# Patient Record
Sex: MALE | Race: WHITE | Employment: FULL TIME | URBAN - METROPOLITAN AREA
[De-identification: names, ages, dates, MRNs, and addresses within clinical notes are randomized per-mention and may not be internally consistent; named-entity substitution may affect disease eponyms.]

---

## 2021-01-01 ENCOUNTER — APPOINTMENT (OUTPATIENT)
Dept: GENERAL RADIOLOGY | Age: 46
DRG: 003 | End: 2021-01-01
Attending: THORACIC SURGERY (CARDIOTHORACIC VASCULAR SURGERY)
Payer: COMMERCIAL

## 2021-01-01 ENCOUNTER — APPOINTMENT (OUTPATIENT)
Dept: ULTRASOUND IMAGING | Age: 46
DRG: 003 | End: 2021-01-01
Attending: INTERNAL MEDICINE
Payer: COMMERCIAL

## 2021-01-01 ENCOUNTER — APPOINTMENT (OUTPATIENT)
Dept: NON INVASIVE DIAGNOSTICS | Age: 46
DRG: 003 | End: 2021-01-01
Attending: INTERNAL MEDICINE
Payer: COMMERCIAL

## 2021-01-01 ENCOUNTER — APPOINTMENT (OUTPATIENT)
Dept: GENERAL RADIOLOGY | Age: 46
DRG: 003 | End: 2021-01-01
Attending: NURSE PRACTITIONER
Payer: COMMERCIAL

## 2021-01-01 ENCOUNTER — APPOINTMENT (OUTPATIENT)
Dept: GENERAL RADIOLOGY | Age: 46
End: 2021-01-01
Attending: EMERGENCY MEDICINE
Payer: COMMERCIAL

## 2021-01-01 ENCOUNTER — ANESTHESIA EVENT (OUTPATIENT)
Dept: CARDIOTHORACIC SURGERY | Age: 46
DRG: 003 | End: 2021-01-01
Payer: COMMERCIAL

## 2021-01-01 ENCOUNTER — ANESTHESIA (OUTPATIENT)
Dept: CARDIOTHORACIC SURGERY | Age: 46
DRG: 003 | End: 2021-01-01
Payer: COMMERCIAL

## 2021-01-01 ENCOUNTER — HOSPITAL ENCOUNTER (INPATIENT)
Age: 46
LOS: 2 days | DRG: 003 | End: 2021-06-11
Attending: ANESTHESIOLOGY | Admitting: ANESTHESIOLOGY
Payer: COMMERCIAL

## 2021-01-01 ENCOUNTER — HOSPITAL ENCOUNTER (OUTPATIENT)
Dept: NON INVASIVE DIAGNOSTICS | Age: 46
Discharge: HOME | End: 2021-06-11
Attending: THORACIC SURGERY (CARDIOTHORACIC VASCULAR SURGERY)

## 2021-01-01 ENCOUNTER — HOSPITAL ENCOUNTER (EMERGENCY)
Age: 46
Discharge: ANOTHER HOSPITAL | End: 2021-06-09
Attending: EMERGENCY MEDICINE
Payer: COMMERCIAL

## 2021-01-01 VITALS
BODY MASS INDEX: 44.1 KG/M2 | HEIGHT: 67 IN | DIASTOLIC BLOOD PRESSURE: 111 MMHG | SYSTOLIC BLOOD PRESSURE: 126 MMHG | WEIGHT: 281 LBS | OXYGEN SATURATION: 90 % | RESPIRATION RATE: 16 BRPM | HEART RATE: 86 BPM | TEMPERATURE: 99.6 F

## 2021-01-01 VITALS
RESPIRATION RATE: 39 BRPM | WEIGHT: 282.19 LBS | BODY MASS INDEX: 44.29 KG/M2 | DIASTOLIC BLOOD PRESSURE: 99 MMHG | OXYGEN SATURATION: 99 % | HEIGHT: 67 IN | SYSTOLIC BLOOD PRESSURE: 150 MMHG | TEMPERATURE: 96 F | HEART RATE: 120 BPM

## 2021-01-01 DIAGNOSIS — R09.02 HYPOXEMIA: ICD-10-CM

## 2021-01-01 DIAGNOSIS — I50.810 RIGHT VENTRICULAR FAILURE (HCC): ICD-10-CM

## 2021-01-01 DIAGNOSIS — K72.00 SHOCK LIVER: ICD-10-CM

## 2021-01-01 DIAGNOSIS — R58 BLEEDING: ICD-10-CM

## 2021-01-01 DIAGNOSIS — U07.1 ACUTE RESPIRATORY FAILURE DUE TO COVID-19 (HCC): ICD-10-CM

## 2021-01-01 DIAGNOSIS — I26.99 ACUTE MASSIVE PULMONARY EMBOLISM (HCC): ICD-10-CM

## 2021-01-01 DIAGNOSIS — U07.1 PNEUMONIA DUE TO COVID-19 VIRUS: Primary | ICD-10-CM

## 2021-01-01 DIAGNOSIS — J96.00 ACUTE RESPIRATORY FAILURE DUE TO COVID-19 (HCC): ICD-10-CM

## 2021-01-01 DIAGNOSIS — N17.1 ACUTE RENAL FAILURE WITH ACUTE CORTICAL NECROSIS (HCC): ICD-10-CM

## 2021-01-01 DIAGNOSIS — J12.82 PNEUMONIA DUE TO COVID-19 VIRUS: Primary | ICD-10-CM

## 2021-01-01 DIAGNOSIS — D65 DIC (DISSEMINATED INTRAVASCULAR COAGULATION) (HCC): ICD-10-CM

## 2021-01-01 DIAGNOSIS — R57.0 CARDIOGENIC SHOCK (HCC): ICD-10-CM

## 2021-01-01 LAB
ACT BLD: 263 SECS (ref 79–138)
ACT BLD: 274 SECS (ref 79–138)
ALBUMIN SERPL-MCNC: 1.2 G/DL (ref 3.5–5)
ALBUMIN SERPL-MCNC: 2 G/DL (ref 3.5–5)
ALBUMIN SERPL-MCNC: 2.4 G/DL (ref 3.5–5)
ALBUMIN SERPL-MCNC: 2.4 G/DL (ref 3.5–5)
ALBUMIN/GLOB SERPL: 0.5 {RATIO} (ref 1.1–2.2)
ALP SERPL-CCNC: 157 U/L (ref 45–117)
ALP SERPL-CCNC: 72 U/L (ref 45–117)
ALP SERPL-CCNC: 80 U/L (ref 45–117)
ALP SERPL-CCNC: 82 U/L (ref 45–117)
ALT SERPL-CCNC: 107 U/L (ref 12–78)
ALT SERPL-CCNC: 124 U/L (ref 12–78)
ALT SERPL-CCNC: 86 U/L (ref 12–78)
ALT SERPL-CCNC: 980 U/L (ref 12–78)
ANION GAP SERPL CALC-SCNC: 11 MMOL/L (ref 5–15)
ANION GAP SERPL CALC-SCNC: 16 MMOL/L (ref 5–15)
ANION GAP SERPL CALC-SCNC: 33 MMOL/L (ref 5–15)
ANION GAP SERPL CALC-SCNC: 6 MMOL/L (ref 5–15)
ANION GAP SERPL CALC-SCNC: 8 MMOL/L (ref 5–15)
ANION GAP SERPL CALC-SCNC: 8 MMOL/L (ref 5–15)
APPEARANCE UR: CLEAR
ARTERIAL PATENCY WRIST A: ABNORMAL
ARTERIAL PATENCY WRIST A: YES
AST SERPL W P-5'-P-CCNC: 135 U/L (ref 15–37)
AST SERPL-CCNC: 127 U/L (ref 15–37)
AST SERPL-CCNC: 1742 U/L (ref 15–37)
AST SERPL-CCNC: 97 U/L (ref 15–37)
ATRIAL RATE: 104 BPM
ATRIAL RATE: 76 BPM
BACTERIA URNS QL MICRO: ABNORMAL /HPF
BASE DEFICIT BLDA-SCNC: 12.4 MMOL/L
BASE DEFICIT BLDA-SCNC: 19.1 MMOL/L
BASE DEFICIT BLDA-SCNC: 9.4 MMOL/L
BASOPHILS # BLD: 0 K/UL (ref 0–0.1)
BASOPHILS # BLD: 0 K/UL (ref 0–0.1)
BASOPHILS # BLD: 0 K/UL (ref 0–0.2)
BASOPHILS # BLD: 0.1 K/UL (ref 0–0.1)
BASOPHILS NFR BLD: 0 % (ref 0–1)
BASOPHILS NFR BLD: 0 % (ref 0–1)
BASOPHILS NFR BLD: 0 % (ref 0–2.5)
BASOPHILS NFR BLD: 1 % (ref 0–1)
BDY SITE: ABNORMAL
BILIRUB SERPL-MCNC: 0.3 MG/DL (ref 0.2–1)
BILIRUB SERPL-MCNC: 0.5 MG/DL (ref 0.2–1)
BILIRUB SERPL-MCNC: 0.5 MG/DL (ref 0.2–1)
BILIRUB SERPL-MCNC: 1.1 MG/DL (ref 0.2–1)
BILIRUB UR QL: NEGATIVE
BLD PROD TYP BPU: NORMAL
BLD PROD TYP BPU: NORMAL
BPU ID: NORMAL
BPU ID: NORMAL
BUN SERPL-MCNC: 22 MG/DL (ref 6–20)
BUN SERPL-MCNC: 24 MG/DL (ref 6–20)
BUN SERPL-MCNC: 26 MG/DL (ref 6–20)
BUN SERPL-MCNC: 30 MG/DL (ref 6–20)
BUN SERPL-MCNC: 31 MG/DL (ref 6–20)
BUN SERPL-MCNC: 34 MG/DL (ref 6–20)
BUN/CREAT SERPL: 14 (ref 12–20)
BUN/CREAT SERPL: 17 (ref 12–20)
BUN/CREAT SERPL: 23 (ref 12–20)
BUN/CREAT SERPL: 28 (ref 12–20)
BUN/CREAT SERPL: 28 (ref 12–20)
BUN/CREAT SERPL: 35 (ref 12–20)
C DIFF GDH STL QL: NEGATIVE
C DIFF TOX A+B STL QL IA: NEGATIVE
CA-I BLD-MCNC: 8.3 MG/DL (ref 8.5–10.1)
CALCIUM SERPL-MCNC: 8.2 MG/DL (ref 8.5–10.1)
CALCIUM SERPL-MCNC: 8.4 MG/DL (ref 8.5–10.1)
CALCIUM SERPL-MCNC: 8.5 MG/DL (ref 8.5–10.1)
CALCIUM SERPL-MCNC: 8.7 MG/DL (ref 8.5–10.1)
CALCIUM SERPL-MCNC: 8.8 MG/DL (ref 8.5–10.1)
CALCULATED P AXIS, ECG09: 29 DEGREES
CALCULATED P AXIS, ECG09: 36 DEGREES
CALCULATED R AXIS, ECG10: 34 DEGREES
CALCULATED R AXIS, ECG10: 68 DEGREES
CALCULATED T AXIS, ECG11: 81 DEGREES
CALCULATED T AXIS, ECG11: 88 DEGREES
CHLORIDE SERPL-SCNC: 101 MMOL/L (ref 97–108)
CHLORIDE SERPL-SCNC: 102 MMOL/L (ref 97–108)
CHLORIDE SERPL-SCNC: 103 MMOL/L (ref 97–108)
CHLORIDE SERPL-SCNC: 106 MMOL/L (ref 97–108)
CHLORIDE SERPL-SCNC: 96 MMOL/L (ref 97–108)
CHLORIDE SERPL-SCNC: 99 MMOL/L (ref 97–108)
CHLORIDE UR-SCNC: <10 MMOL/L
CO2 SERPL-SCNC: 19 MMOL/L (ref 21–32)
CO2 SERPL-SCNC: 24 MMOL/L (ref 21–32)
CO2 SERPL-SCNC: 25 MMOL/L (ref 21–32)
CO2 SERPL-SCNC: 27 MMOL/L (ref 21–32)
COLOR UR: ABNORMAL
COMMENT, HOLDF: NORMAL
CREAT SERPL-MCNC: 0.8 MG/DL (ref 0.7–1.3)
CREAT SERPL-MCNC: 0.87 MG/DL (ref 0.7–1.3)
CREAT SERPL-MCNC: 0.89 MG/DL (ref 0.7–1.3)
CREAT SERPL-MCNC: 1.33 MG/DL (ref 0.7–1.3)
CREAT SERPL-MCNC: 1.85 MG/DL (ref 0.7–1.3)
CREAT SERPL-MCNC: 2.03 MG/DL (ref 0.7–1.3)
CREAT UR-MCNC: 126 MG/DL
CRP SERPL HS-MCNC: >9.5 MG/L
D DIMER PPP FEU-MCNC: 6.9 MG/L FEU (ref 0–0.65)
DIAGNOSIS, 93000: NORMAL
DIAGNOSIS, 93000: NORMAL
DIFFERENTIAL METHOD BLD: ABNORMAL
EOSINOPHIL # BLD: 0 K/UL (ref 0–0.4)
EOSINOPHIL # BLD: 0 K/UL (ref 0–0.7)
EOSINOPHIL NFR BLD: 0 % (ref 0.9–2.9)
EOSINOPHIL NFR BLD: 0 % (ref 0–7)
EPITH CASTS URNS QL MICRO: ABNORMAL /LPF
ERYTHROCYTE [DISTWIDTH] IN BLOOD BY AUTOMATED COUNT: 13 % (ref 11.5–14.5)
ERYTHROCYTE [DISTWIDTH] IN BLOOD BY AUTOMATED COUNT: 13.1 % (ref 11.5–14.5)
ERYTHROCYTE [DISTWIDTH] IN BLOOD BY AUTOMATED COUNT: 13.2 % (ref 11.5–14.5)
ERYTHROCYTE [DISTWIDTH] IN BLOOD BY AUTOMATED COUNT: 13.5 % (ref 11.5–14.5)
ERYTHROCYTE [DISTWIDTH] IN BLOOD BY AUTOMATED COUNT: 13.7 % (ref 11.5–14.5)
EST. AVERAGE GLUCOSE BLD GHB EST-MCNC: 140 MG/DL
FERRITIN SERPL-MCNC: 5336 NG/ML (ref 26–388)
FIBRINOGEN PPP-MCNC: 719 MG/DL (ref 200–475)
FIO2 ON VENT: 100 %
GLOBULIN SER CALC-MCNC: 2.2 G/DL (ref 2–4)
GLOBULIN SER CALC-MCNC: 3.9 G/DL (ref 2–4)
GLOBULIN SER CALC-MCNC: 4.4 G/DL (ref 2–4)
GLOBULIN SER CALC-MCNC: 4.5 G/DL (ref 2–4)
GLUCOSE BLD STRIP.AUTO-MCNC: 200 MG/DL (ref 65–117)
GLUCOSE BLD STRIP.AUTO-MCNC: 213 MG/DL (ref 65–117)
GLUCOSE BLD STRIP.AUTO-MCNC: 216 MG/DL (ref 65–117)
GLUCOSE BLD STRIP.AUTO-MCNC: 219 MG/DL (ref 65–117)
GLUCOSE BLD STRIP.AUTO-MCNC: 222 MG/DL (ref 65–117)
GLUCOSE BLD STRIP.AUTO-MCNC: 222 MG/DL (ref 65–117)
GLUCOSE BLD STRIP.AUTO-MCNC: 232 MG/DL (ref 65–117)
GLUCOSE BLD STRIP.AUTO-MCNC: 233 MG/DL (ref 65–117)
GLUCOSE SERPL-MCNC: 196 MG/DL (ref 65–100)
GLUCOSE SERPL-MCNC: 213 MG/DL (ref 65–100)
GLUCOSE SERPL-MCNC: 236 MG/DL (ref 65–100)
GLUCOSE SERPL-MCNC: 237 MG/DL (ref 65–100)
GLUCOSE SERPL-MCNC: 240 MG/DL (ref 65–100)
GLUCOSE SERPL-MCNC: 362 MG/DL (ref 65–100)
GLUCOSE UR STRIP.AUTO-MCNC: 100 MG/DL
HBA1C MFR BLD: 6.5 % (ref 4–5.6)
HCO3 BLDA-SCNC: 13 MMOL/L (ref 22–26)
HCO3 BLDA-SCNC: 18 MMOL/L (ref 22–26)
HCO3 BLDA-SCNC: 21 MMOL/L (ref 22–26)
HCT VFR BLD AUTO: 34 % (ref 36.6–50.3)
HCT VFR BLD AUTO: 42.5 % (ref 36.6–50.3)
HCT VFR BLD AUTO: 43.2 % (ref 36.6–50.3)
HCT VFR BLD AUTO: 44.6 % (ref 36.6–50.3)
HCT VFR BLD AUTO: 44.9 % (ref 41–53)
HGB BLD-MCNC: 10.7 G/DL (ref 12.1–17)
HGB BLD-MCNC: 14.2 G/DL (ref 12.1–17)
HGB BLD-MCNC: 14.8 G/DL (ref 12.1–17)
HGB BLD-MCNC: 14.9 G/DL (ref 12.1–17)
HGB BLD-MCNC: 15.7 G/DL (ref 13.5–17.5)
HGB UR QL STRIP: ABNORMAL
HISTORY CHECKED?,CKHIST: NORMAL
IMM GRANULOCYTES # BLD AUTO: 0 K/UL
IMM GRANULOCYTES # BLD AUTO: 0 K/UL
IMM GRANULOCYTES # BLD AUTO: 0.3 K/UL (ref 0–0.04)
IMM GRANULOCYTES NFR BLD AUTO: 0 %
IMM GRANULOCYTES NFR BLD AUTO: 0 %
IMM GRANULOCYTES NFR BLD AUTO: 3 % (ref 0–0.5)
INR PPP: 1.1 (ref 0.9–1.1)
INTERPRETATION: NORMAL
KETONES UR QL STRIP.AUTO: ABNORMAL MG/DL
LACTATE SERPL-SCNC: 1.4 MMOL/L (ref 0.4–2)
LACTATE SERPL-SCNC: 2.3 MMOL/L (ref 0.4–2)
LACTATE SERPL-SCNC: 24.8 MMOL/L (ref 0.4–2)
LEUKOCYTE ESTERASE UR QL STRIP.AUTO: NEGATIVE
LIPASE SERPL-CCNC: 89 U/L (ref 73–393)
LYMPHOCYTES # BLD: 0.3 K/UL (ref 0.8–3.5)
LYMPHOCYTES # BLD: 0.4 K/UL (ref 0.8–3.5)
LYMPHOCYTES # BLD: 0.4 K/UL (ref 1–4.8)
LYMPHOCYTES # BLD: 0.6 K/UL (ref 0.8–3.5)
LYMPHOCYTES NFR BLD: 5 % (ref 12–49)
LYMPHOCYTES NFR BLD: 5 % (ref 12–49)
LYMPHOCYTES NFR BLD: 5 % (ref 20.5–51.1)
LYMPHOCYTES NFR BLD: 7 % (ref 12–49)
MAGNESIUM SERPL-MCNC: 1.6 MG/DL (ref 1.6–2.4)
MAGNESIUM SERPL-MCNC: 2.1 MG/DL (ref 1.6–2.4)
MCH RBC QN AUTO: 29.8 PG (ref 26–34)
MCH RBC QN AUTO: 30.2 PG (ref 26–34)
MCH RBC QN AUTO: 30.2 PG (ref 26–34)
MCH RBC QN AUTO: 30.9 PG (ref 26–34)
MCH RBC QN AUTO: 30.9 PG (ref 31–34)
MCHC RBC AUTO-ENTMCNC: 31.5 G/DL (ref 30–36.5)
MCHC RBC AUTO-ENTMCNC: 33.2 G/DL (ref 30–36.5)
MCHC RBC AUTO-ENTMCNC: 33.4 G/DL (ref 30–36.5)
MCHC RBC AUTO-ENTMCNC: 34.5 G/DL (ref 30–36.5)
MCHC RBC AUTO-ENTMCNC: 34.9 G/DL (ref 31–36)
MCV RBC AUTO: 87.4 FL (ref 80–99)
MCV RBC AUTO: 88.5 FL (ref 80–100)
MCV RBC AUTO: 89.9 FL (ref 80–99)
MCV RBC AUTO: 90.4 FL (ref 80–99)
MCV RBC AUTO: 98.3 FL (ref 80–99)
METAMYELOCYTES NFR BLD MANUAL: 1 %
MONOCYTES # BLD: 0 K/UL (ref 0–1)
MONOCYTES # BLD: 0.2 K/UL (ref 0.2–2.4)
MONOCYTES # BLD: 0.2 K/UL (ref 0–1)
MONOCYTES # BLD: 0.4 K/UL (ref 0–1)
MONOCYTES NFR BLD: 0 % (ref 5–13)
MONOCYTES NFR BLD: 2 % (ref 1.7–9.3)
MONOCYTES NFR BLD: 4 % (ref 5–13)
MONOCYTES NFR BLD: 5 % (ref 5–13)
NEUTS BAND NFR BLD MANUAL: 2 % (ref 0–6)
NEUTS SEG # BLD: 4.6 K/UL (ref 1.8–8)
NEUTS SEG # BLD: 6.9 K/UL (ref 1.8–8)
NEUTS SEG # BLD: 6.9 K/UL (ref 1.8–8)
NEUTS SEG # BLD: 8.3 K/UL (ref 1.8–7.7)
NEUTS SEG NFR BLD: 84 % (ref 32–75)
NEUTS SEG NFR BLD: 91 % (ref 32–75)
NEUTS SEG NFR BLD: 92 % (ref 32–75)
NEUTS SEG NFR BLD: 93 % (ref 42–75)
NITRITE UR QL STRIP.AUTO: NEGATIVE
NRBC # BLD: 0 K/UL
NRBC # BLD: 0 K/UL (ref 0–0.01)
NRBC # BLD: 0.22 K/UL (ref 0–0.01)
NRBC BLD-RTO: 0 PER 100 WBC
NRBC BLD-RTO: 0.1 PER 100 WBC
NRBC BLD-RTO: 0.7 PER 100 WBC
O+P SPEC MICRO: NORMAL
O+P STL CONC: NORMAL
P-R INTERVAL, ECG05: 134 MS
P-R INTERVAL, ECG05: 142 MS
PCO2 BLDA: 58 MMHG (ref 35–45)
PCO2 BLDA: 59 MMHG (ref 35–45)
PCO2 BLDA: 69 MMHG (ref 35–45)
PCO2 BLDA: >95 MMHG (ref 35–45)
PCO2 BLDA: >95 MMHG (ref 35–45)
PEEP RESPIRATORY: 14 CM[H2O]
PH BLDA: 6.9 [PH] (ref 7.35–7.45)
PH BLDA: 6.93 [PH] (ref 7.35–7.45)
PH BLDA: 6.97 [PH] (ref 7.35–7.45)
PH BLDA: 7.1 [PH] (ref 7.35–7.45)
PH BLDA: 7.11 [PH] (ref 7.35–7.45)
PH UR STRIP: 5 [PH] (ref 5–8)
PHOSPHATE SERPL-MCNC: 3.4 MG/DL (ref 2.6–4.7)
PLATELET # BLD AUTO: 130 K/UL (ref 150–400)
PLATELET # BLD AUTO: 142 K/UL (ref 150–400)
PLATELET # BLD AUTO: 185 K/UL (ref 150–400)
PLATELET # BLD AUTO: 196 K/UL (ref 150–400)
PLATELET # BLD AUTO: 196 K/UL (ref 150–400)
PLATELET COMMENTS,PCOM: ABNORMAL
PMV BLD AUTO: 10 FL (ref 8.9–12.9)
PMV BLD AUTO: 10.3 FL (ref 8.9–12.9)
PMV BLD AUTO: 10.6 FL (ref 8.9–12.9)
PMV BLD AUTO: 10.6 FL (ref 8.9–12.9)
PMV BLD AUTO: 8.1 FL (ref 6.5–11.5)
PO2 BLDA: 31 MMHG (ref 80–100)
PO2 BLDA: 35 MMHG (ref 80–100)
PO2 BLDA: 356 MMHG (ref 80–100)
PO2 BLDA: 44 MMHG (ref 80–100)
PO2 BLDA: 72 MMHG (ref 80–100)
POTASSIUM SERPL-SCNC: 2.9 MMOL/L (ref 3.5–5.1)
POTASSIUM SERPL-SCNC: 2.9 MMOL/L (ref 3.5–5.1)
POTASSIUM SERPL-SCNC: 3.5 MMOL/L (ref 3.5–5.1)
POTASSIUM SERPL-SCNC: 3.6 MMOL/L (ref 3.5–5.1)
POTASSIUM SERPL-SCNC: 3.6 MMOL/L (ref 3.5–5.1)
POTASSIUM SERPL-SCNC: 4 MMOL/L (ref 3.5–5.1)
PROCALCITONIN SERPL-MCNC: 1.45 NG/ML
PROT SERPL-MCNC: 3.4 G/DL (ref 6.4–8.2)
PROT SERPL-MCNC: 5.9 G/DL (ref 6.4–8.2)
PROT SERPL-MCNC: 6.8 G/DL (ref 6.4–8.2)
PROT SERPL-MCNC: 6.9 G/DL (ref 6.4–8.2)
PROT UR STRIP-MCNC: 30 MG/DL
PROTHROMBIN TIME: 11.1 SEC (ref 9–11.1)
Q-T INTERVAL, ECG07: 351 MS
Q-T INTERVAL, ECG07: 414 MS
QRS DURATION, ECG06: 102 MS
QRS DURATION, ECG06: 105 MS
QTC CALCULATION (BEZET), ECG08: 462 MS
QTC CALCULATION (BEZET), ECG08: 465 MS
RBC # BLD AUTO: 3.46 M/UL (ref 4.1–5.7)
RBC # BLD AUTO: 4.7 M/UL (ref 4.1–5.7)
RBC # BLD AUTO: 4.94 M/UL (ref 4.1–5.7)
RBC # BLD AUTO: 4.96 M/UL (ref 4.1–5.7)
RBC # BLD AUTO: 5.07 M/UL (ref 4.5–5.9)
RBC #/AREA URNS HPF: ABNORMAL /HPF (ref 0–5)
RBC MORPH BLD: ABNORMAL
SAMPLES BEING HELD,HOLD: NORMAL
SAO2 % BLD: 100 % (ref 92–97)
SAO2 % BLD: 63 % (ref 92–97)
SAO2 % BLD: 83 % (ref 92–97)
SAO2% DEVICE SAO2% SENSOR NAME: ABNORMAL
SERVICE CMNT-IMP: ABNORMAL
SODIUM SERPL-SCNC: 134 MMOL/L (ref 136–145)
SODIUM SERPL-SCNC: 135 MMOL/L (ref 136–145)
SODIUM SERPL-SCNC: 136 MMOL/L (ref 136–145)
SODIUM SERPL-SCNC: 138 MMOL/L (ref 136–145)
SODIUM SERPL-SCNC: 141 MMOL/L (ref 136–145)
SODIUM SERPL-SCNC: 154 MMOL/L (ref 136–145)
SODIUM UR-SCNC: 14 MMOL/L
SP GR UR REFRACTOMETRY: 1.02 (ref 1–1.03)
SPECIMEN SITE: ABNORMAL
SPECIMEN SOURCE: NORMAL
STATUS OF UNIT,%ST: NORMAL
STATUS OF UNIT,%ST: NORMAL
TROPONIN I SERPL-MCNC: 0.11 NG/ML
UNIT DIVISION, %UDIV: 0
UNIT DIVISION, %UDIV: 0
UROBILINOGEN UR QL STRIP.AUTO: 1 EU/DL (ref 0.2–1)
VENTRICULAR RATE, ECG03: 104 BPM
VENTRICULAR RATE, ECG03: 76 BPM
WBC # BLD AUTO: 32.1 K/UL (ref 4.1–11.1)
WBC # BLD AUTO: 5.1 K/UL (ref 4.1–11.1)
WBC # BLD AUTO: 7.3 K/UL (ref 4.1–11.1)
WBC # BLD AUTO: 8.3 K/UL (ref 4.1–11.1)
WBC # BLD AUTO: 9 K/UL (ref 4.4–11.3)
WBC #/AREA STL HPF: NORMAL /HPF (ref 0–4)
WBC URNS QL MICRO: ABNORMAL /HPF (ref 0–4)

## 2021-01-01 PROCEDURE — 80048 BASIC METABOLIC PNL TOTAL CA: CPT

## 2021-01-01 PROCEDURE — 86141 C-REACTIVE PROTEIN HS: CPT

## 2021-01-01 PROCEDURE — 02HV33Z INSERTION OF INFUSION DEVICE INTO SUPERIOR VENA CAVA, PERCUTANEOUS APPROACH: ICD-10-PCS | Performed by: STUDENT IN AN ORGANIZED HEALTH CARE EDUCATION/TRAINING PROGRAM

## 2021-01-01 PROCEDURE — 74011250636 HC RX REV CODE- 250/636: Performed by: STUDENT IN AN ORGANIZED HEALTH CARE EDUCATION/TRAINING PROGRAM

## 2021-01-01 PROCEDURE — 77030002986 HC SUT PROL J&J -A: Performed by: THORACIC SURGERY (CARDIOTHORACIC VASCULAR SURGERY)

## 2021-01-01 PROCEDURE — 82436 ASSAY OF URINE CHLORIDE: CPT

## 2021-01-01 PROCEDURE — 84484 ASSAY OF TROPONIN QUANT: CPT

## 2021-01-01 PROCEDURE — 77030041076 HC DRSG AG OPTICELL MDII -A: Performed by: THORACIC SURGERY (CARDIOTHORACIC VASCULAR SURGERY)

## 2021-01-01 PROCEDURE — 94660 CPAP INITIATION&MGMT: CPT

## 2021-01-01 PROCEDURE — 74011250637 HC RX REV CODE- 250/637: Performed by: NURSE PRACTITIONER

## 2021-01-01 PROCEDURE — 77030008462 HC STPLR SKN PROX J&J -A: Performed by: THORACIC SURGERY (CARDIOTHORACIC VASCULAR SURGERY)

## 2021-01-01 PROCEDURE — 74011000250 HC RX REV CODE- 250: Performed by: THORACIC SURGERY (CARDIOTHORACIC VASCULAR SURGERY)

## 2021-01-01 PROCEDURE — 77030033542 HC RETRCTR RETNTS PANICLS GQSM -B: Performed by: THORACIC SURGERY (CARDIOTHORACIC VASCULAR SURGERY)

## 2021-01-01 PROCEDURE — 74011250637 HC RX REV CODE- 250/637: Performed by: EMERGENCY MEDICINE

## 2021-01-01 PROCEDURE — C1894 INTRO/SHEATH, NON-LASER: HCPCS | Performed by: THORACIC SURGERY (CARDIOTHORACIC VASCULAR SURGERY)

## 2021-01-01 PROCEDURE — 36415 COLL VENOUS BLD VENIPUNCTURE: CPT

## 2021-01-01 PROCEDURE — 87324 CLOSTRIDIUM AG IA: CPT

## 2021-01-01 PROCEDURE — 99285 EMERGENCY DEPT VISIT HI MDM: CPT

## 2021-01-01 PROCEDURE — 2709999900 HC NON-CHARGEABLE SUPPLY

## 2021-01-01 PROCEDURE — 87177 OVA AND PARASITES SMEARS: CPT

## 2021-01-01 PROCEDURE — 35820 EXPLORE CHEST VESSELS: CPT | Performed by: THORACIC SURGERY (CARDIOTHORACIC VASCULAR SURGERY)

## 2021-01-01 PROCEDURE — 74011000258 HC RX REV CODE- 258: Performed by: THORACIC SURGERY (CARDIOTHORACIC VASCULAR SURGERY)

## 2021-01-01 PROCEDURE — 65610000006 HC RM INTENSIVE CARE

## 2021-01-01 PROCEDURE — P9045 ALBUMIN (HUMAN), 5%, 250 ML: HCPCS | Performed by: STUDENT IN AN ORGANIZED HEALTH CARE EDUCATION/TRAINING PROGRAM

## 2021-01-01 PROCEDURE — 77030010505 HC ADH BIOGLU CRYO -F: Performed by: THORACIC SURGERY (CARDIOTHORACIC VASCULAR SURGERY)

## 2021-01-01 PROCEDURE — 77030002524 HC INSTR CLMP FGRTY EDWD -B: Performed by: THORACIC SURGERY (CARDIOTHORACIC VASCULAR SURGERY)

## 2021-01-01 PROCEDURE — 03HY32Z INSERTION OF MONITORING DEVICE INTO UPPER ARTERY, PERCUTANEOUS APPROACH: ICD-10-PCS | Performed by: STUDENT IN AN ORGANIZED HEALTH CARE EDUCATION/TRAINING PROGRAM

## 2021-01-01 PROCEDURE — 76010000129 HC CV SURG 1.5 TO 2 HR: Performed by: THORACIC SURGERY (CARDIOTHORACIC VASCULAR SURGERY)

## 2021-01-01 PROCEDURE — 2709999900 HC NON-CHARGEABLE SUPPLY: Performed by: THORACIC SURGERY (CARDIOTHORACIC VASCULAR SURGERY)

## 2021-01-01 PROCEDURE — 82962 GLUCOSE BLOOD TEST: CPT

## 2021-01-01 PROCEDURE — 71045 X-RAY EXAM CHEST 1 VIEW: CPT

## 2021-01-01 PROCEDURE — 83036 HEMOGLOBIN GLYCOSYLATED A1C: CPT

## 2021-01-01 PROCEDURE — 74011000250 HC RX REV CODE- 250: Performed by: STUDENT IN AN ORGANIZED HEALTH CARE EDUCATION/TRAINING PROGRAM

## 2021-01-01 PROCEDURE — 0BH17EZ INSERTION OF ENDOTRACHEAL AIRWAY INTO TRACHEA, VIA NATURAL OR ARTIFICIAL OPENING: ICD-10-PCS | Performed by: STUDENT IN AN ORGANIZED HEALTH CARE EDUCATION/TRAINING PROGRAM

## 2021-01-01 PROCEDURE — 83605 ASSAY OF LACTIC ACID: CPT

## 2021-01-01 PROCEDURE — 74011000250 HC RX REV CODE- 250: Performed by: ANESTHESIOLOGY

## 2021-01-01 PROCEDURE — 85025 COMPLETE CBC W/AUTO DIFF WBC: CPT

## 2021-01-01 PROCEDURE — 5A1522G EXTRACORPOREAL OXYGENATION, MEMBRANE, PERIPHERAL VENO-ARTERIAL: ICD-10-PCS | Performed by: THORACIC SURGERY (CARDIOTHORACIC VASCULAR SURGERY)

## 2021-01-01 PROCEDURE — 74011250636 HC RX REV CODE- 250/636: Performed by: THORACIC SURGERY (CARDIOTHORACIC VASCULAR SURGERY)

## 2021-01-01 PROCEDURE — 80053 COMPREHEN METABOLIC PANEL: CPT

## 2021-01-01 PROCEDURE — 85384 FIBRINOGEN ACTIVITY: CPT

## 2021-01-01 PROCEDURE — 85610 PROTHROMBIN TIME: CPT

## 2021-01-01 PROCEDURE — 02HV33Z INSERTION OF INFUSION DEVICE INTO SUPERIOR VENA CAVA, PERCUTANEOUS APPROACH: ICD-10-PCS | Performed by: ANESTHESIOLOGY

## 2021-01-01 PROCEDURE — B24BZZ4 ULTRASONOGRAPHY OF HEART WITH AORTA, TRANSESOPHAGEAL: ICD-10-PCS | Performed by: ANESTHESIOLOGY

## 2021-01-01 PROCEDURE — 74011000258 HC RX REV CODE- 258: Performed by: NURSE PRACTITIONER

## 2021-01-01 PROCEDURE — 77030014008 HC SPNG HEMSTAT J&J -C: Performed by: THORACIC SURGERY (CARDIOTHORACIC VASCULAR SURGERY)

## 2021-01-01 PROCEDURE — 74011636637 HC RX REV CODE- 636/637: Performed by: STUDENT IN AN ORGANIZED HEALTH CARE EDUCATION/TRAINING PROGRAM

## 2021-01-01 PROCEDURE — 74011000258 HC RX REV CODE- 258: Performed by: STUDENT IN AN ORGANIZED HEALTH CARE EDUCATION/TRAINING PROGRAM

## 2021-01-01 PROCEDURE — 85379 FIBRIN DEGRADATION QUANT: CPT

## 2021-01-01 PROCEDURE — C1768 GRAFT, VASCULAR: HCPCS | Performed by: THORACIC SURGERY (CARDIOTHORACIC VASCULAR SURGERY)

## 2021-01-01 PROCEDURE — 99291 CRITICAL CARE FIRST HOUR: CPT | Performed by: THORACIC SURGERY (CARDIOTHORACIC VASCULAR SURGERY)

## 2021-01-01 PROCEDURE — 93355 ECHO TRANSESOPHAGEAL (TEE): CPT | Performed by: THORACIC SURGERY (CARDIOTHORACIC VASCULAR SURGERY)

## 2021-01-01 PROCEDURE — 82803 BLOOD GASES ANY COMBINATION: CPT

## 2021-01-01 PROCEDURE — 74011000250 HC RX REV CODE- 250: Performed by: NURSE PRACTITIONER

## 2021-01-01 PROCEDURE — 74011000250 HC RX REV CODE- 250

## 2021-01-01 PROCEDURE — 31500 INSERT EMERGENCY AIRWAY: CPT

## 2021-01-01 PROCEDURE — 76010000111 HC CV SURG 3.5 TO 4 HR: Performed by: THORACIC SURGERY (CARDIOTHORACIC VASCULAR SURGERY)

## 2021-01-01 PROCEDURE — P9073 PLATELETS PHERESIS PATH REDU: HCPCS

## 2021-01-01 PROCEDURE — 77030018729 HC ELECTRD DEFIB PAD CARD -B: Performed by: THORACIC SURGERY (CARDIOTHORACIC VASCULAR SURGERY)

## 2021-01-01 PROCEDURE — 77030040361 HC SLV COMPR DVT MDII -B

## 2021-01-01 PROCEDURE — 74011636637 HC RX REV CODE- 636/637: Performed by: NURSE PRACTITIONER

## 2021-01-01 PROCEDURE — 99292 CRITICAL CARE ADDL 30 MIN: CPT | Performed by: THORACIC SURGERY (CARDIOTHORACIC VASCULAR SURGERY)

## 2021-01-01 PROCEDURE — C1892 INTRO/SHEATH,FIXED,PEEL-AWAY: HCPCS | Performed by: THORACIC SURGERY (CARDIOTHORACIC VASCULAR SURGERY)

## 2021-01-01 PROCEDURE — 36430 TRANSFUSION BLD/BLD COMPNT: CPT

## 2021-01-01 PROCEDURE — 84100 ASSAY OF PHOSPHORUS: CPT

## 2021-01-01 PROCEDURE — 96375 TX/PRO/DX INJ NEW DRUG ADDON: CPT

## 2021-01-01 PROCEDURE — 77030028840 HC PMP VAD BLD CENTRIMAG STJU -L: Performed by: THORACIC SURGERY (CARDIOTHORACIC VASCULAR SURGERY)

## 2021-01-01 PROCEDURE — 5A1935Z RESPIRATORY VENTILATION, LESS THAN 24 CONSECUTIVE HOURS: ICD-10-PCS | Performed by: STUDENT IN AN ORGANIZED HEALTH CARE EDUCATION/TRAINING PROGRAM

## 2021-01-01 PROCEDURE — 77030011220 HC DEV ELECSURG COVD -B: Performed by: THORACIC SURGERY (CARDIOTHORACIC VASCULAR SURGERY)

## 2021-01-01 PROCEDURE — 82728 ASSAY OF FERRITIN: CPT

## 2021-01-01 PROCEDURE — 77030008477 HC STYL SATN SLP COVD -A

## 2021-01-01 PROCEDURE — 74011000250 HC RX REV CODE- 250: Performed by: NURSE ANESTHETIST, CERTIFIED REGISTERED

## 2021-01-01 PROCEDURE — 30233R1 TRANSFUSION OF NONAUTOLOGOUS PLATELETS INTO PERIPHERAL VEIN, PERCUTANEOUS APPROACH: ICD-10-PCS | Performed by: STUDENT IN AN ORGANIZED HEALTH CARE EDUCATION/TRAINING PROGRAM

## 2021-01-01 PROCEDURE — 84145 PROCALCITONIN (PCT): CPT

## 2021-01-01 PROCEDURE — 77030008683 HC TU ET CUF COVD -A

## 2021-01-01 PROCEDURE — 86901 BLOOD TYPING SEROLOGIC RH(D): CPT

## 2021-01-01 PROCEDURE — 0W380ZZ CONTROL BLEEDING IN CHEST WALL, OPEN APPROACH: ICD-10-PCS | Performed by: THORACIC SURGERY (CARDIOTHORACIC VASCULAR SURGERY)

## 2021-01-01 PROCEDURE — 81001 URINALYSIS AUTO W/SCOPE: CPT

## 2021-01-01 PROCEDURE — C1751 CATH, INF, PER/CENT/MIDLINE: HCPCS

## 2021-01-01 PROCEDURE — 77030040754 HC DRSG QCLOT ZMED -D: Performed by: THORACIC SURGERY (CARDIOTHORACIC VASCULAR SURGERY)

## 2021-01-01 PROCEDURE — 30233N1 TRANSFUSION OF NONAUTOLOGOUS RED BLOOD CELLS INTO PERIPHERAL VEIN, PERCUTANEOUS APPROACH: ICD-10-PCS | Performed by: STUDENT IN AN ORGANIZED HEALTH CARE EDUCATION/TRAINING PROGRAM

## 2021-01-01 PROCEDURE — 76770 US EXAM ABDO BACK WALL COMP: CPT

## 2021-01-01 PROCEDURE — 94002 VENT MGMT INPAT INIT DAY: CPT

## 2021-01-01 PROCEDURE — 76060000038 HC ANESTHESIA 3.5 TO 4 HR: Performed by: THORACIC SURGERY (CARDIOTHORACIC VASCULAR SURGERY)

## 2021-01-01 PROCEDURE — P9016 RBC LEUKOCYTES REDUCED: HCPCS

## 2021-01-01 PROCEDURE — C1769 GUIDE WIRE: HCPCS | Performed by: THORACIC SURGERY (CARDIOTHORACIC VASCULAR SURGERY)

## 2021-01-01 PROCEDURE — 73610000026 HC INO THERAPY EACH HOUR

## 2021-01-01 PROCEDURE — 36600 WITHDRAWAL OF ARTERIAL BLOOD: CPT

## 2021-01-01 PROCEDURE — 74011250636 HC RX REV CODE- 250/636

## 2021-01-01 PROCEDURE — 74011250636 HC RX REV CODE- 250/636: Performed by: ANESTHESIOLOGY

## 2021-01-01 PROCEDURE — 33954 ECMO/ECLS INSJ PRPH CANNULA: CPT | Performed by: THORACIC SURGERY (CARDIOTHORACIC VASCULAR SURGERY)

## 2021-01-01 PROCEDURE — 89055 LEUKOCYTE ASSESSMENT FECAL: CPT

## 2021-01-01 PROCEDURE — 74011250636 HC RX REV CODE- 250/636: Performed by: NURSE ANESTHETIST, CERTIFIED REGISTERED

## 2021-01-01 PROCEDURE — 85027 COMPLETE CBC AUTOMATED: CPT

## 2021-01-01 PROCEDURE — 82570 ASSAY OF URINE CREATININE: CPT

## 2021-01-01 PROCEDURE — 93005 ELECTROCARDIOGRAM TRACING: CPT

## 2021-01-01 PROCEDURE — 84300 ASSAY OF URINE SODIUM: CPT

## 2021-01-01 PROCEDURE — 76060000034 HC ANESTHESIA 1.5 TO 2 HR: Performed by: THORACIC SURGERY (CARDIOTHORACIC VASCULAR SURGERY)

## 2021-01-01 PROCEDURE — 83735 ASSAY OF MAGNESIUM: CPT

## 2021-01-01 PROCEDURE — P9045 ALBUMIN (HUMAN), 5%, 250 ML: HCPCS

## 2021-01-01 PROCEDURE — 77030020089 HC KT PERC FEM ART MEDT -C: Performed by: THORACIC SURGERY (CARDIOTHORACIC VASCULAR SURGERY)

## 2021-01-01 PROCEDURE — 73610000005 HC INO THERAPY INITIAL

## 2021-01-01 PROCEDURE — 77030013386: Performed by: THORACIC SURGERY (CARDIOTHORACIC VASCULAR SURGERY)

## 2021-01-01 PROCEDURE — 85347 COAGULATION TIME ACTIVATED: CPT

## 2021-01-01 PROCEDURE — 77030002996 HC SUT SLK J&J -A: Performed by: THORACIC SURGERY (CARDIOTHORACIC VASCULAR SURGERY)

## 2021-01-01 PROCEDURE — 83690 ASSAY OF LIPASE: CPT

## 2021-01-01 PROCEDURE — 74011250636 HC RX REV CODE- 250/636: Performed by: NURSE PRACTITIONER

## 2021-01-01 PROCEDURE — 77030003029 HC SUT VCRL J&J -B: Performed by: THORACIC SURGERY (CARDIOTHORACIC VASCULAR SURGERY)

## 2021-01-01 PROCEDURE — 96365 THER/PROPH/DIAG IV INF INIT: CPT

## 2021-01-01 PROCEDURE — 77030008771 HC TU NG SALEM SUMP -A

## 2021-01-01 PROCEDURE — 87040 BLOOD CULTURE FOR BACTERIA: CPT

## 2021-01-01 PROCEDURE — 74011250636 HC RX REV CODE- 250/636: Performed by: EMERGENCY MEDICINE

## 2021-01-01 PROCEDURE — 77030034689 HC VASC DIL KT W/NDL LIVA -C: Performed by: THORACIC SURGERY (CARDIOTHORACIC VASCULAR SURGERY)

## 2021-01-01 PROCEDURE — 86923 COMPATIBILITY TEST ELECTRIC: CPT

## 2021-01-01 PROCEDURE — 33947 ECMO/ECLS INITIATION ARTERY: CPT | Performed by: THORACIC SURGERY (CARDIOTHORACIC VASCULAR SURGERY)

## 2021-01-01 PROCEDURE — 77030005513 HC CATH URETH FOL11 MDII -B

## 2021-01-01 DEVICE — PUMP BLD CENTRIMAG: Type: IMPLANTABLE DEVICE | Status: FUNCTIONAL

## 2021-01-01 RX ORDER — INSULIN GLARGINE 100 [IU]/ML
8 INJECTION, SOLUTION SUBCUTANEOUS
Status: DISCONTINUED | OUTPATIENT
Start: 2021-01-01 | End: 2021-06-15 | Stop reason: HOSPADM

## 2021-01-01 RX ORDER — ROCURONIUM BROMIDE 10 MG/ML
INJECTION, SOLUTION INTRAVENOUS
Status: COMPLETED
Start: 2021-01-01 | End: 2021-01-01

## 2021-01-01 RX ORDER — POTASSIUM CHLORIDE 20 MEQ/1
40 TABLET, EXTENDED RELEASE ORAL
Status: COMPLETED | OUTPATIENT
Start: 2021-01-01 | End: 2021-01-01

## 2021-01-01 RX ORDER — HYDRALAZINE HYDROCHLORIDE 20 MG/ML
20 INJECTION INTRAMUSCULAR; INTRAVENOUS
Status: DISCONTINUED | OUTPATIENT
Start: 2021-01-01 | End: 2021-06-15 | Stop reason: HOSPADM

## 2021-01-01 RX ORDER — ROCURONIUM BROMIDE 10 MG/ML
100 INJECTION, SOLUTION INTRAVENOUS
Status: COMPLETED | OUTPATIENT
Start: 2021-01-01 | End: 2021-01-01

## 2021-01-01 RX ORDER — ESCITALOPRAM OXALATE 5 MG/1
5 TABLET ORAL DAILY
COMMUNITY

## 2021-01-01 RX ORDER — ACETAMINOPHEN 325 MG/1
650 TABLET ORAL
Status: DISCONTINUED | OUTPATIENT
Start: 2021-01-01 | End: 2021-06-15 | Stop reason: HOSPADM

## 2021-01-01 RX ORDER — SODIUM BICARBONATE 1 MEQ/ML
SYRINGE (ML) INTRAVENOUS AS NEEDED
Status: DISCONTINUED | OUTPATIENT
Start: 2021-01-01 | End: 2021-01-01 | Stop reason: HOSPADM

## 2021-01-01 RX ORDER — SODIUM CHLORIDE 9 MG/ML
250 INJECTION, SOLUTION INTRAVENOUS AS NEEDED
Status: DISCONTINUED | OUTPATIENT
Start: 2021-01-01 | End: 2021-06-15 | Stop reason: HOSPADM

## 2021-01-01 RX ORDER — CALCIUM CHLORIDE INJECTION 100 MG/ML
INJECTION, SOLUTION INTRAVENOUS AS NEEDED
Status: DISCONTINUED | OUTPATIENT
Start: 2021-01-01 | End: 2021-01-01 | Stop reason: HOSPADM

## 2021-01-01 RX ORDER — FENTANYL CITRATE 50 UG/ML
INJECTION, SOLUTION INTRAMUSCULAR; INTRAVENOUS
Status: COMPLETED
Start: 2021-01-01 | End: 2021-01-01

## 2021-01-01 RX ORDER — POLYETHYLENE GLYCOL 3350 17 G/17G
17 POWDER, FOR SOLUTION ORAL DAILY PRN
Status: DISCONTINUED | OUTPATIENT
Start: 2021-01-01 | End: 2021-06-15 | Stop reason: HOSPADM

## 2021-01-01 RX ORDER — LISINOPRIL 10 MG/1
10 TABLET ORAL DAILY
COMMUNITY

## 2021-01-01 RX ORDER — PROPOFOL 10 MG/ML
0-50 VIAL (ML) INTRAVENOUS
Status: DISCONTINUED | OUTPATIENT
Start: 2021-01-01 | End: 2021-06-15 | Stop reason: HOSPADM

## 2021-01-01 RX ORDER — PROTAMINE SULFATE 10 MG/ML
50 INJECTION, SOLUTION INTRAVENOUS
Status: COMPLETED | OUTPATIENT
Start: 2021-01-01 | End: 2021-01-01

## 2021-01-01 RX ORDER — ONDANSETRON 4 MG/1
4 TABLET, FILM COATED ORAL
COMMUNITY

## 2021-01-01 RX ORDER — ZINC SULFATE 50(220)MG
1 CAPSULE ORAL DAILY
Status: DISCONTINUED | OUTPATIENT
Start: 2021-01-01 | End: 2021-06-15 | Stop reason: HOSPADM

## 2021-01-01 RX ORDER — ALBUMIN HUMAN 50 G/1000ML
SOLUTION INTRAVENOUS
Status: COMPLETED
Start: 2021-01-01 | End: 2021-01-01

## 2021-01-01 RX ORDER — CALCIUM CHLORIDE INJECTION 100 MG/ML
1 INJECTION, SOLUTION INTRAVENOUS ONCE
Status: COMPLETED | OUTPATIENT
Start: 2021-01-01 | End: 2021-01-01

## 2021-01-01 RX ORDER — PROTAMINE SULFATE 10 MG/ML
INJECTION, SOLUTION INTRAVENOUS
Status: COMPLETED
Start: 2021-01-01 | End: 2021-01-01

## 2021-01-01 RX ORDER — NOREPINEPHRINE BITARTRATE/D5W 8 MG/250ML
.5-3 PLASTIC BAG, INJECTION (ML) INTRAVENOUS
Status: DISCONTINUED | OUTPATIENT
Start: 2021-01-01 | End: 2021-01-01 | Stop reason: SDUPTHER

## 2021-01-01 RX ORDER — PROTAMINE SULFATE 10 MG/ML
100 INJECTION, SOLUTION INTRAVENOUS
Status: COMPLETED | OUTPATIENT
Start: 2021-01-01 | End: 2021-01-01

## 2021-01-01 RX ORDER — CODEINE PHOSPHATE AND GUAIFENESIN 10; 100 MG/5ML; MG/5ML
5 SOLUTION ORAL
Status: DISCONTINUED | OUTPATIENT
Start: 2021-01-01 | End: 2021-06-15 | Stop reason: HOSPADM

## 2021-01-01 RX ORDER — ROCURONIUM BROMIDE 10 MG/ML
50 INJECTION, SOLUTION INTRAVENOUS
Status: COMPLETED | OUTPATIENT
Start: 2021-01-01 | End: 2021-01-01

## 2021-01-01 RX ORDER — MELATONIN
2000 DAILY
Status: DISCONTINUED | OUTPATIENT
Start: 2021-01-01 | End: 2021-06-15 | Stop reason: HOSPADM

## 2021-01-01 RX ORDER — ETOMIDATE 2 MG/ML
20 INJECTION INTRAVENOUS ONCE
Status: COMPLETED | OUTPATIENT
Start: 2021-01-01 | End: 2021-01-01

## 2021-01-01 RX ORDER — ALBUMIN HUMAN 50 G/1000ML
25 SOLUTION INTRAVENOUS ONCE
Status: DISCONTINUED | OUTPATIENT
Start: 2021-01-01 | End: 2021-06-12 | Stop reason: HOSPADM

## 2021-01-01 RX ORDER — SODIUM CHLORIDE, SODIUM LACTATE, POTASSIUM CHLORIDE, CALCIUM CHLORIDE 600; 310; 30; 20 MG/100ML; MG/100ML; MG/100ML; MG/100ML
INJECTION, SOLUTION INTRAVENOUS
Status: DISCONTINUED | OUTPATIENT
Start: 2021-01-01 | End: 2021-01-01 | Stop reason: HOSPADM

## 2021-01-01 RX ORDER — ALBUMIN HUMAN 50 G/1000ML
SOLUTION INTRAVENOUS
Status: DISCONTINUED
Start: 2021-01-01 | End: 2021-06-12 | Stop reason: HOSPADM

## 2021-01-01 RX ORDER — ASCORBIC ACID 500 MG
500 TABLET ORAL DAILY
Status: DISCONTINUED | OUTPATIENT
Start: 2021-01-01 | End: 2021-06-15 | Stop reason: HOSPADM

## 2021-01-01 RX ORDER — CALCIUM CHLORIDE INJECTION 100 MG/ML
INJECTION, SOLUTION INTRAVENOUS
Status: DISCONTINUED
Start: 2021-01-01 | End: 2021-06-12 | Stop reason: HOSPADM

## 2021-01-01 RX ORDER — ALBUMIN HUMAN 50 G/1000ML
50 SOLUTION INTRAVENOUS ONCE
Status: COMPLETED | OUTPATIENT
Start: 2021-01-01 | End: 2021-01-01

## 2021-01-01 RX ORDER — ONDANSETRON 2 MG/ML
4 INJECTION INTRAMUSCULAR; INTRAVENOUS
Status: DISCONTINUED | OUTPATIENT
Start: 2021-01-01 | End: 2021-06-15 | Stop reason: HOSPADM

## 2021-01-01 RX ORDER — SODIUM BICARBONATE 1 MEQ/ML
SYRINGE (ML) INTRAVENOUS
Status: COMPLETED
Start: 2021-01-01 | End: 2021-01-01

## 2021-01-01 RX ORDER — ONDANSETRON 4 MG/1
4 TABLET, ORALLY DISINTEGRATING ORAL
Status: DISCONTINUED | OUTPATIENT
Start: 2021-01-01 | End: 2021-06-15 | Stop reason: HOSPADM

## 2021-01-01 RX ORDER — SODIUM BICARBONATE 84 MG/ML
50 INJECTION, SOLUTION INTRAVENOUS
Status: COMPLETED | OUTPATIENT
Start: 2021-01-01 | End: 2021-01-01

## 2021-01-01 RX ORDER — INSULIN LISPRO 100 [IU]/ML
INJECTION, SOLUTION INTRAVENOUS; SUBCUTANEOUS EVERY 6 HOURS
Status: DISCONTINUED | OUTPATIENT
Start: 2021-01-01 | End: 2021-01-01

## 2021-01-01 RX ORDER — PROPOFOL 10 MG/ML
INJECTION, EMULSION INTRAVENOUS
Status: COMPLETED
Start: 2021-01-01 | End: 2021-01-01

## 2021-01-01 RX ORDER — HYDROCHLOROTHIAZIDE 12.5 MG/1
12.5 CAPSULE ORAL DAILY
COMMUNITY

## 2021-01-01 RX ORDER — PHENYLEPHRINE HCL IN 0.9% NACL 0.4MG/10ML
SYRINGE (ML) INTRAVENOUS
Status: DISCONTINUED
Start: 2021-01-01 | End: 2021-01-01 | Stop reason: HOSPADM

## 2021-01-01 RX ORDER — SODIUM CHLORIDE 0.9 % (FLUSH) 0.9 %
5-40 SYRINGE (ML) INJECTION AS NEEDED
Status: DISCONTINUED | OUTPATIENT
Start: 2021-01-01 | End: 2021-06-15 | Stop reason: HOSPADM

## 2021-01-01 RX ORDER — LOPERAMIDE HYDROCHLORIDE 2 MG/1
2 CAPSULE ORAL
Status: DISCONTINUED | OUTPATIENT
Start: 2021-01-01 | End: 2021-06-15 | Stop reason: HOSPADM

## 2021-01-01 RX ORDER — ACETAMINOPHEN 650 MG/1
650 SUPPOSITORY RECTAL
Status: DISCONTINUED | OUTPATIENT
Start: 2021-01-01 | End: 2021-06-15 | Stop reason: HOSPADM

## 2021-01-01 RX ORDER — ERGOCALCIFEROL 1.25 MG/1
50000 CAPSULE ORAL
COMMUNITY

## 2021-01-01 RX ORDER — FENTANYL CITRATE 50 UG/ML
100 INJECTION, SOLUTION INTRAMUSCULAR; INTRAVENOUS ONCE
Status: COMPLETED | OUTPATIENT
Start: 2021-01-01 | End: 2021-01-01

## 2021-01-01 RX ORDER — DEXAMETHASONE SODIUM PHOSPHATE 4 MG/ML
6 INJECTION, SOLUTION INTRA-ARTICULAR; INTRALESIONAL; INTRAMUSCULAR; INTRAVENOUS; SOFT TISSUE
Status: COMPLETED | OUTPATIENT
Start: 2021-01-01 | End: 2021-01-01

## 2021-01-01 RX ORDER — DEXTROSE 50 % IN WATER (D50W) INTRAVENOUS SYRINGE
12.5-25 AS NEEDED
Status: DISCONTINUED | OUTPATIENT
Start: 2021-01-01 | End: 2021-06-15 | Stop reason: HOSPADM

## 2021-01-01 RX ORDER — SODIUM BICARBONATE 84 MG/ML
100 INJECTION, SOLUTION INTRAVENOUS
Status: COMPLETED | OUTPATIENT
Start: 2021-01-01 | End: 2021-01-01

## 2021-01-01 RX ORDER — PROTAMINE SULFATE 10 MG/ML
50 INJECTION, SOLUTION INTRAVENOUS
Status: DISCONTINUED | OUTPATIENT
Start: 2021-01-01 | End: 2021-01-01

## 2021-01-01 RX ORDER — SODIUM CHLORIDE 0.9 % (FLUSH) 0.9 %
5-40 SYRINGE (ML) INJECTION EVERY 8 HOURS
Status: DISCONTINUED | OUTPATIENT
Start: 2021-01-01 | End: 2021-06-15 | Stop reason: HOSPADM

## 2021-01-01 RX ORDER — ENOXAPARIN SODIUM 100 MG/ML
40 INJECTION SUBCUTANEOUS EVERY 12 HOURS
Status: DISCONTINUED | OUTPATIENT
Start: 2021-01-01 | End: 2021-06-15 | Stop reason: HOSPADM

## 2021-01-01 RX ORDER — HYDROCORTISONE SODIUM SUCCINATE 100 MG/2ML
100 INJECTION, POWDER, FOR SOLUTION INTRAMUSCULAR; INTRAVENOUS ONCE
Status: COMPLETED | OUTPATIENT
Start: 2021-01-01 | End: 2021-01-01

## 2021-01-01 RX ORDER — SODIUM CHLORIDE, SODIUM LACTATE, POTASSIUM CHLORIDE, CALCIUM CHLORIDE 600; 310; 30; 20 MG/100ML; MG/100ML; MG/100ML; MG/100ML
75 INJECTION, SOLUTION INTRAVENOUS CONTINUOUS
Status: DISCONTINUED | OUTPATIENT
Start: 2021-01-01 | End: 2021-01-01

## 2021-01-01 RX ORDER — INSULIN LISPRO 100 [IU]/ML
INJECTION, SOLUTION INTRAVENOUS; SUBCUTANEOUS
Status: DISCONTINUED | OUTPATIENT
Start: 2021-01-01 | End: 2021-01-01

## 2021-01-01 RX ORDER — LANOLIN ALCOHOL/MO/W.PET/CERES
6 CREAM (GRAM) TOPICAL
Status: DISCONTINUED | OUTPATIENT
Start: 2021-01-01 | End: 2021-06-15 | Stop reason: HOSPADM

## 2021-01-01 RX ORDER — MAGNESIUM SULFATE 100 %
4 CRYSTALS MISCELLANEOUS AS NEEDED
Status: DISCONTINUED | OUTPATIENT
Start: 2021-01-01 | End: 2021-06-15 | Stop reason: HOSPADM

## 2021-01-01 RX ORDER — ESCITALOPRAM OXALATE 10 MG/1
5 TABLET ORAL DAILY
Status: DISCONTINUED | OUTPATIENT
Start: 2021-01-01 | End: 2021-06-15 | Stop reason: HOSPADM

## 2021-01-01 RX ORDER — LABETALOL HYDROCHLORIDE 5 MG/ML
20 INJECTION, SOLUTION INTRAVENOUS
Status: DISCONTINUED | OUTPATIENT
Start: 2021-01-01 | End: 2021-06-15 | Stop reason: HOSPADM

## 2021-01-01 RX ORDER — PROTAMINE SULFATE 10 MG/ML
INJECTION, SOLUTION INTRAVENOUS
Status: DISCONTINUED
Start: 2021-01-01 | End: 2021-06-12 | Stop reason: HOSPADM

## 2021-01-01 RX ORDER — INSULIN LISPRO 100 [IU]/ML
INJECTION, SOLUTION INTRAVENOUS; SUBCUTANEOUS EVERY 6 HOURS
Status: DISCONTINUED | OUTPATIENT
Start: 2021-01-01 | End: 2021-06-15 | Stop reason: HOSPADM

## 2021-01-01 RX ORDER — SODIUM CHLORIDE 9 MG/ML
125 INJECTION, SOLUTION INTRAVENOUS ONCE
Status: COMPLETED | OUTPATIENT
Start: 2021-01-01 | End: 2021-01-01

## 2021-01-01 RX ORDER — DEXMEDETOMIDINE HYDROCHLORIDE 4 UG/ML
.1-1.5 INJECTION, SOLUTION INTRAVENOUS
Status: DISCONTINUED | OUTPATIENT
Start: 2021-01-01 | End: 2021-06-15 | Stop reason: HOSPADM

## 2021-01-01 RX ORDER — EPINEPHRINE 1 MG/ML
INJECTION, SOLUTION, CONCENTRATE INTRAVENOUS AS NEEDED
Status: DISCONTINUED | OUTPATIENT
Start: 2021-01-01 | End: 2021-01-01 | Stop reason: HOSPADM

## 2021-01-01 RX ORDER — DEXAMETHASONE SODIUM PHOSPHATE 4 MG/ML
6 INJECTION, SOLUTION INTRA-ARTICULAR; INTRALESIONAL; INTRAMUSCULAR; INTRAVENOUS; SOFT TISSUE EVERY 24 HOURS
Status: DISCONTINUED | OUTPATIENT
Start: 2021-01-01 | End: 2021-06-15 | Stop reason: HOSPADM

## 2021-01-01 RX ADMIN — SODIUM BICARBONATE 50 MEQ: 84 INJECTION, SOLUTION INTRAVENOUS at 09:45

## 2021-01-01 RX ADMIN — CISATRACURIUM BESYLATE 10 MCG/KG/MIN: 2 INJECTION INTRAVENOUS at 08:40

## 2021-01-01 RX ADMIN — ALBUMIN (HUMAN) 50 G: 12.5 INJECTION, SOLUTION INTRAVENOUS at 13:18

## 2021-01-01 RX ADMIN — HYDROCORTISONE SODIUM SUCCINATE 100 MG: 100 INJECTION, POWDER, FOR SOLUTION INTRAMUSCULAR; INTRAVENOUS at 13:17

## 2021-01-01 RX ADMIN — EPINEPHRINE 1 MG: 1 INJECTION, SOLUTION, CONCENTRATE INTRAVENOUS at 10:10

## 2021-01-01 RX ADMIN — DEXAMETHASONE SODIUM PHOSPHATE 6 MG: 4 INJECTION, SOLUTION INTRAMUSCULAR; INTRAVENOUS at 23:19

## 2021-01-01 RX ADMIN — PHENOL 1 SPRAY: 1.4 SPRAY ORAL at 22:15

## 2021-01-01 RX ADMIN — PROPOFOL 50 MCG/KG/MIN: 10 INJECTION, EMULSION INTRAVENOUS at 08:00

## 2021-01-01 RX ADMIN — EPINEPHRINE 1 MG: 1 INJECTION, SOLUTION, CONCENTRATE INTRAVENOUS at 10:03

## 2021-01-01 RX ADMIN — DEXAMETHASONE SODIUM PHOSPHATE 6 MG: 4 INJECTION, SOLUTION INTRAMUSCULAR; INTRAVENOUS at 08:27

## 2021-01-01 RX ADMIN — PROTAMINE SULFATE 100 MG: 10 INJECTION, SOLUTION INTRAVENOUS at 15:10

## 2021-01-01 RX ADMIN — EPINEPHRINE 1 MG: 1 INJECTION, SOLUTION, CONCENTRATE INTRAVENOUS at 10:58

## 2021-01-01 RX ADMIN — SODIUM BICARBONATE 50 MEQ: 84 INJECTION, SOLUTION INTRAVENOUS at 09:50

## 2021-01-01 RX ADMIN — VASOPRESSIN 0.1 UNITS/MIN: 20 INJECTION INTRAVENOUS at 11:28

## 2021-01-01 RX ADMIN — PROTAMINE SULFATE 100 MG: 10 INJECTION, SOLUTION INTRAVENOUS at 14:55

## 2021-01-01 RX ADMIN — PROTAMINE SULFATE 100 MG: 10 INJECTION, SOLUTION INTRAVENOUS at 14:13

## 2021-01-01 RX ADMIN — DEXMEDETOMIDINE HYDROCHLORIDE 1.1 MCG/KG/HR: 400 INJECTION INTRAVENOUS at 00:47

## 2021-01-01 RX ADMIN — Medication 10 ML: at 13:09

## 2021-01-01 RX ADMIN — VASOPRESSIN 0.1 UNITS/MIN: 20 INJECTION INTRAVENOUS at 09:37

## 2021-01-01 RX ADMIN — EPINEPHRINE 300 MCG/MIN: 1 INJECTION INTRAMUSCULAR; INTRAVENOUS; SUBCUTANEOUS at 11:06

## 2021-01-01 RX ADMIN — EPINEPHRINE 1 MG: 1 INJECTION, SOLUTION, CONCENTRATE INTRAVENOUS at 09:51

## 2021-01-01 RX ADMIN — VASOPRESSIN 0.1 UNITS/MIN: 20 INJECTION INTRAVENOUS at 14:36

## 2021-01-01 RX ADMIN — INSULIN LISPRO 3 UNITS: 100 INJECTION, SOLUTION INTRAVENOUS; SUBCUTANEOUS at 13:12

## 2021-01-01 RX ADMIN — TOCILIZUMAB 800 MG: 20 INJECTION, SOLUTION, CONCENTRATE INTRAVENOUS at 12:17

## 2021-01-01 RX ADMIN — DEXAMETHASONE SODIUM PHOSPHATE 6 MG: 4 INJECTION, SOLUTION INTRAMUSCULAR; INTRAVENOUS at 09:41

## 2021-01-01 RX ADMIN — SODIUM BICARBONATE 50 MEQ: 84 INJECTION, SOLUTION INTRAVENOUS at 11:18

## 2021-01-01 RX ADMIN — ROCURONIUM BROMIDE 100 MG: 10 INJECTION INTRAVENOUS at 07:14

## 2021-01-01 RX ADMIN — EPINEPHRINE 300 MCG/MIN: 1 INJECTION INTRAMUSCULAR; INTRAVENOUS; SUBCUTANEOUS at 14:23

## 2021-01-01 RX ADMIN — EPINEPHRINE 30 MCG/MIN: 1 INJECTION INTRAMUSCULAR; INTRAVENOUS; SUBCUTANEOUS at 09:55

## 2021-01-01 RX ADMIN — PROTAMINE SULFATE 50 MG: 10 INJECTION, SOLUTION INTRAVENOUS at 14:32

## 2021-01-01 RX ADMIN — CEFTRIAXONE SODIUM 1 G: 1 INJECTION, POWDER, FOR SOLUTION INTRAMUSCULAR; INTRAVENOUS at 08:34

## 2021-01-01 RX ADMIN — SODIUM BICARBONATE 50 MEQ: 84 INJECTION, SOLUTION INTRAVENOUS at 09:58

## 2021-01-01 RX ADMIN — EPINEPHRINE 1 MG: 1 INJECTION, SOLUTION, CONCENTRATE INTRAVENOUS at 09:43

## 2021-01-01 RX ADMIN — CEFTRIAXONE SODIUM 1 G: 1 INJECTION, POWDER, FOR SOLUTION INTRAMUSCULAR; INTRAVENOUS at 09:40

## 2021-01-01 RX ADMIN — SODIUM BICARBONATE 100 MEQ: 84 INJECTION, SOLUTION INTRAVENOUS at 09:30

## 2021-01-01 RX ADMIN — EPINEPHRINE 1 MG: 1 INJECTION, SOLUTION, CONCENTRATE INTRAVENOUS at 09:29

## 2021-01-01 RX ADMIN — FAMOTIDINE 20 MG: 10 INJECTION, SOLUTION INTRAVENOUS at 08:31

## 2021-01-01 RX ADMIN — Medication 10 ML: at 23:32

## 2021-01-01 RX ADMIN — INSULIN LISPRO 3 UNITS: 100 INJECTION, SOLUTION INTRAVENOUS; SUBCUTANEOUS at 16:16

## 2021-01-01 RX ADMIN — ENOXAPARIN SODIUM 40 MG: 40 INJECTION SUBCUTANEOUS at 20:04

## 2021-01-01 RX ADMIN — ETOMIDATE 20 MG: 2 INJECTION INTRAVENOUS at 07:14

## 2021-01-01 RX ADMIN — EPINEPHRINE 1 MG: 1 INJECTION, SOLUTION, CONCENTRATE INTRAVENOUS at 10:48

## 2021-01-01 RX ADMIN — AZITHROMYCIN MONOHYDRATE 500 MG: 500 INJECTION, POWDER, LYOPHILIZED, FOR SOLUTION INTRAVENOUS at 23:19

## 2021-01-01 RX ADMIN — SODIUM CHLORIDE, POTASSIUM CHLORIDE, SODIUM LACTATE AND CALCIUM CHLORIDE 75 ML/HR: 600; 310; 30; 20 INJECTION, SOLUTION INTRAVENOUS at 08:25

## 2021-01-01 RX ADMIN — AZITHROMYCIN MONOHYDRATE 500 MG: 500 INJECTION, POWDER, LYOPHILIZED, FOR SOLUTION INTRAVENOUS at 23:27

## 2021-01-01 RX ADMIN — NOREPINEPHRINE BITARTRATE 300 MCG/MIN: 1 INJECTION, SOLUTION, CONCENTRATE INTRAVENOUS at 12:02

## 2021-01-01 RX ADMIN — SODIUM CHLORIDE, POTASSIUM CHLORIDE, SODIUM LACTATE AND CALCIUM CHLORIDE 1000 ML: 600; 310; 30; 20 INJECTION, SOLUTION INTRAVENOUS at 07:06

## 2021-01-01 RX ADMIN — EPINEPHRINE 300 MCG/MIN: 1 INJECTION INTRAMUSCULAR; INTRAVENOUS; SUBCUTANEOUS at 13:42

## 2021-01-01 RX ADMIN — EPINEPHRINE 300 MCG/MIN: 1 INJECTION INTRAMUSCULAR; INTRAVENOUS; SUBCUTANEOUS at 14:52

## 2021-01-01 RX ADMIN — ENOXAPARIN SODIUM 40 MG: 40 INJECTION SUBCUTANEOUS at 09:41

## 2021-01-01 RX ADMIN — CALCIUM CHLORIDE 1 G: 100 INJECTION, SOLUTION INTRAVENOUS at 14:09

## 2021-01-01 RX ADMIN — SODIUM BICARBONATE 50 MEQ: 84 INJECTION, SOLUTION INTRAVENOUS at 10:21

## 2021-01-01 RX ADMIN — INSULIN LISPRO 4 UNITS: 100 INJECTION, SOLUTION INTRAVENOUS; SUBCUTANEOUS at 23:50

## 2021-01-01 RX ADMIN — AZITHROMYCIN MONOHYDRATE 500 MG: 500 INJECTION, POWDER, LYOPHILIZED, FOR SOLUTION INTRAVENOUS at 23:59

## 2021-01-01 RX ADMIN — SODIUM BICARBONATE 50 MEQ: 84 INJECTION, SOLUTION INTRAVENOUS at 10:15

## 2021-01-01 RX ADMIN — EPINEPHRINE 1 MG: 1 INJECTION, SOLUTION, CONCENTRATE INTRAVENOUS at 10:26

## 2021-01-01 RX ADMIN — INSULIN LISPRO 2 UNITS: 100 INJECTION, SOLUTION INTRAVENOUS; SUBCUTANEOUS at 23:27

## 2021-01-01 RX ADMIN — Medication 200 MCG/HR: at 07:34

## 2021-01-01 RX ADMIN — ROCURONIUM BROMIDE 100 MG: 10 INJECTION, SOLUTION INTRAVENOUS at 07:14

## 2021-01-01 RX ADMIN — NOREPINEPHRINE BITARTRATE 300 MCG/MIN: 1 INJECTION, SOLUTION, CONCENTRATE INTRAVENOUS at 14:35

## 2021-01-01 RX ADMIN — ROCURONIUM BROMIDE 50 MG: 10 SOLUTION INTRAVENOUS at 08:45

## 2021-01-01 RX ADMIN — DEXMEDETOMIDINE HYDROCHLORIDE 1.5 MCG/KG/HR: 400 INJECTION INTRAVENOUS at 06:21

## 2021-01-01 RX ADMIN — NOREPINEPHRINE BITARTRATE 200 MCG/MIN: 1 INJECTION, SOLUTION, CONCENTRATE INTRAVENOUS at 10:07

## 2021-01-01 RX ADMIN — SODIUM BICARBONATE 50 MEQ: 84 INJECTION, SOLUTION INTRAVENOUS at 10:56

## 2021-01-01 RX ADMIN — PROTAMINE SULFATE 50 MG: 10 INJECTION, SOLUTION INTRAVENOUS at 14:08

## 2021-01-01 RX ADMIN — FAMOTIDINE 20 MG: 10 INJECTION, SOLUTION INTRAVENOUS at 18:53

## 2021-01-01 RX ADMIN — GUAIFENESIN AND CODEINE PHOSPHATE 5 ML: 100; 10 SOLUTION ORAL at 22:15

## 2021-01-01 RX ADMIN — ROCURONIUM BROMIDE 50 MG: 10 INJECTION, SOLUTION INTRAVENOUS at 08:45

## 2021-01-01 RX ADMIN — FENTANYL CITRATE 100 MCG: 50 INJECTION, SOLUTION INTRAMUSCULAR; INTRAVENOUS at 07:13

## 2021-01-01 RX ADMIN — Medication 10 ML: at 07:05

## 2021-01-01 RX ADMIN — EPINEPHRINE 1 MG: 1 INJECTION, SOLUTION, CONCENTRATE INTRAVENOUS at 11:17

## 2021-01-01 RX ADMIN — GUAIFENESIN AND CODEINE PHOSPHATE 5 ML: 100; 10 SOLUTION ORAL at 05:40

## 2021-01-01 RX ADMIN — ALBUMIN (HUMAN) 50 G: 12.5 INJECTION, SOLUTION INTRAVENOUS at 13:50

## 2021-01-01 RX ADMIN — NOREPINEPHRINE BITARTRATE 300 MCG/MIN: 1 INJECTION, SOLUTION, CONCENTRATE INTRAVENOUS at 10:00

## 2021-01-01 RX ADMIN — ENOXAPARIN SODIUM 40 MG: 40 INJECTION SUBCUTANEOUS at 08:26

## 2021-01-01 RX ADMIN — SODIUM CHLORIDE 125 ML/HR: 9 INJECTION, SOLUTION INTRAVENOUS at 23:18

## 2021-01-01 RX ADMIN — SODIUM BICARBONATE 50 MEQ: 84 INJECTION, SOLUTION INTRAVENOUS at 10:30

## 2021-01-01 RX ADMIN — CALCIUM CHLORIDE 1 G: 100 INJECTION, SOLUTION INTRAVENOUS; INTRAVENTRICULAR at 11:41

## 2021-01-01 RX ADMIN — DEXMEDETOMIDINE HYDROCHLORIDE 0.5 MCG/KG/HR: 400 INJECTION INTRAVENOUS at 17:33

## 2021-01-01 RX ADMIN — INSULIN LISPRO 3 UNITS: 100 INJECTION, SOLUTION INTRAVENOUS; SUBCUTANEOUS at 12:29

## 2021-01-01 RX ADMIN — ENOXAPARIN SODIUM 40 MG: 40 INJECTION SUBCUTANEOUS at 20:12

## 2021-01-01 RX ADMIN — Medication 20 ML: at 22:00

## 2021-01-01 RX ADMIN — Medication 2000 UNITS: at 08:34

## 2021-01-01 RX ADMIN — CISATRACURIUM BESYLATE 10 MCG/KG/MIN: 2 INJECTION INTRAVENOUS at 13:47

## 2021-01-01 RX ADMIN — EPINEPHRINE 1 MG: 1 INJECTION, SOLUTION, CONCENTRATE INTRAVENOUS at 10:18

## 2021-01-01 RX ADMIN — FAMOTIDINE 20 MG: 10 INJECTION, SOLUTION INTRAVENOUS at 17:21

## 2021-01-01 RX ADMIN — EPINEPHRINE 300 MCG/MIN: 1 INJECTION INTRAMUSCULAR; INTRAVENOUS; SUBCUTANEOUS at 11:26

## 2021-01-01 RX ADMIN — SODIUM BICARBONATE 50 MEQ: 84 INJECTION, SOLUTION INTRAVENOUS at 10:10

## 2021-01-01 RX ADMIN — ALBUMIN HUMAN 50 G: 50 SOLUTION INTRAVENOUS at 13:50

## 2021-01-01 RX ADMIN — Medication 1 CAPSULE: at 08:34

## 2021-01-01 RX ADMIN — EPINEPHRINE 1 MG: 1 INJECTION, SOLUTION, CONCENTRATE INTRAVENOUS at 10:31

## 2021-01-01 RX ADMIN — EPINEPHRINE 1 MG: 1 INJECTION, SOLUTION, CONCENTRATE INTRAVENOUS at 10:38

## 2021-01-01 RX ADMIN — CISATRACURIUM BESYLATE 10 MCG/KG/MIN: 2 INJECTION INTRAVENOUS at 10:59

## 2021-01-01 RX ADMIN — POTASSIUM CHLORIDE 40 MEQ: 1500 TABLET, EXTENDED RELEASE ORAL at 23:21

## 2021-01-01 RX ADMIN — SODIUM CHLORIDE, POTASSIUM CHLORIDE, SODIUM LACTATE AND CALCIUM CHLORIDE 75 ML/HR: 600; 310; 30; 20 INJECTION, SOLUTION INTRAVENOUS at 01:03

## 2021-01-01 RX ADMIN — GUAIFENESIN AND CODEINE PHOSPHATE 5 ML: 100; 10 SOLUTION ORAL at 01:44

## 2021-01-01 RX ADMIN — DEXMEDETOMIDINE HYDROCHLORIDE 1.1 MCG/KG/HR: 400 INJECTION INTRAVENOUS at 04:02

## 2021-01-01 RX ADMIN — SODIUM BICARBONATE 50 MEQ: 84 INJECTION, SOLUTION INTRAVENOUS at 10:51

## 2021-01-01 RX ADMIN — NOREPINEPHRINE BITARTRATE 30 MCG/MIN: 1 INJECTION, SOLUTION, CONCENTRATE INTRAVENOUS at 09:10

## 2021-01-01 RX ADMIN — SODIUM BICARBONATE 50 MEQ: 84 INJECTION, SOLUTION INTRAVENOUS at 11:10

## 2021-01-01 RX ADMIN — EPINEPHRINE 300 MCG/MIN: 1 INJECTION INTRAMUSCULAR; INTRAVENOUS; SUBCUTANEOUS at 11:47

## 2021-01-01 RX ADMIN — SODIUM BICARBONATE 50 MEQ: 84 INJECTION, SOLUTION INTRAVENOUS at 09:30

## 2021-01-01 RX ADMIN — Medication 10 ML: at 05:21

## 2021-01-01 RX ADMIN — INSULIN LISPRO 3 UNITS: 100 INJECTION, SOLUTION INTRAVENOUS; SUBCUTANEOUS at 06:51

## 2021-01-01 RX ADMIN — Medication 20 ML: at 06:00

## 2021-01-01 RX ADMIN — FAMOTIDINE 20 MG: 10 INJECTION, SOLUTION INTRAVENOUS at 09:40

## 2021-01-01 RX ADMIN — SODIUM BICARBONATE 50 MEQ: 84 INJECTION, SOLUTION INTRAVENOUS at 10:09

## 2021-01-01 RX ADMIN — Medication 10 ML: at 13:56

## 2021-01-01 RX ADMIN — ESCITALOPRAM OXALATE 5 MG: 10 TABLET ORAL at 08:34

## 2021-01-01 RX ADMIN — DEXMEDETOMIDINE HYDROCHLORIDE 0.7 MCG/KG/HR: 400 INJECTION INTRAVENOUS at 21:15

## 2021-01-01 RX ADMIN — PROPOFOL 50 MCG/KG/MIN: 10 INJECTION, EMULSION INTRAVENOUS at 14:00

## 2021-01-01 RX ADMIN — SODIUM BICARBONATE 50 MEQ: 84 INJECTION, SOLUTION INTRAVENOUS at 10:38

## 2021-01-01 RX ADMIN — PHENOL 1 SPRAY: 1.4 SPRAY ORAL at 01:44

## 2021-01-01 RX ADMIN — CALCIUM CHLORIDE 1 G: 100 INJECTION, SOLUTION INTRAVENOUS; INTRAVENTRICULAR at 12:35

## 2021-01-01 RX ADMIN — PROPOFOL 50 MCG/KG/MIN: 10 INJECTION, EMULSION INTRAVENOUS at 11:29

## 2021-01-01 RX ADMIN — ROCURONIUM BROMIDE 50 MG: 10 INJECTION, SOLUTION INTRAVENOUS at 07:15

## 2021-01-01 RX ADMIN — NOREPINEPHRINE BITARTRATE 300 MCG/MIN: 1 INJECTION, SOLUTION, CONCENTRATE INTRAVENOUS at 11:28

## 2021-01-01 RX ADMIN — SODIUM BICARBONATE 50 MEQ: 84 INJECTION, SOLUTION INTRAVENOUS at 12:35

## 2021-01-01 RX ADMIN — SODIUM CHLORIDE, POTASSIUM CHLORIDE, SODIUM LACTATE AND CALCIUM CHLORIDE: 600; 310; 30; 20 INJECTION, SOLUTION INTRAVENOUS at 09:29

## 2021-01-01 RX ADMIN — SODIUM BICARBONATE 50 MEQ: 84 INJECTION, SOLUTION INTRAVENOUS at 09:37

## 2021-01-01 RX ADMIN — EPINEPHRINE 1 MG: 1 INJECTION, SOLUTION, CONCENTRATE INTRAVENOUS at 11:30

## 2021-01-01 RX ADMIN — OXYCODONE HYDROCHLORIDE AND ACETAMINOPHEN 500 MG: 500 TABLET ORAL at 08:34

## 2021-01-01 RX ADMIN — INSULIN LISPRO 3 UNITS: 100 INJECTION, SOLUTION INTRAVENOUS; SUBCUTANEOUS at 17:33

## 2021-01-01 RX ADMIN — SODIUM CHLORIDE 1000 ML: 900 INJECTION, SOLUTION INTRAVENOUS at 09:15

## 2021-01-01 RX ADMIN — SODIUM BICARBONATE 50 MEQ: 84 INJECTION, SOLUTION INTRAVENOUS at 11:31

## 2021-01-01 RX ADMIN — ROCURONIUM BROMIDE 50 MG: 10 INJECTION INTRAVENOUS at 07:15

## 2021-01-01 RX ADMIN — INSULIN GLARGINE 8 UNITS: 100 INJECTION, SOLUTION SUBCUTANEOUS at 23:45

## 2021-01-01 RX ADMIN — HYDRALAZINE HYDROCHLORIDE 20 MG: 20 INJECTION INTRAMUSCULAR; INTRAVENOUS at 17:21

## 2021-01-01 RX ADMIN — INSULIN LISPRO 4 UNITS: 100 INJECTION, SOLUTION INTRAVENOUS; SUBCUTANEOUS at 06:18

## 2021-06-09 PROBLEM — J96.00 ACUTE RESPIRATORY FAILURE DUE TO COVID-19 (HCC): Status: ACTIVE | Noted: 2021-01-01

## 2021-06-09 PROBLEM — U07.1 ACUTE RESPIRATORY FAILURE DUE TO COVID-19 (HCC): Status: ACTIVE | Noted: 2021-01-01

## 2021-06-09 NOTE — PROGRESS NOTES
[id number] TRANSFER - IN REPORT: 
 
Verbal report received from Boaz Byrd RN (name) on Hudson Perez  being received from [facility] ED(unit) for urgent transfer Report consisted of patients Situation, Background, Assessment and  
Recommendations(SBAR). Information from the following report(s) SBAR, Kardex, Procedure Summary, Intake/Output, MAR, Accordion, Recent Results and Cardiac Rhythm NSR was reviewed with the receiving nurse. Opportunity for questions and clarification was provided. Assessment completed upon patients arrival to unit and care assumed. Awaiting medical transport, currently unknown ETA 
 
0455: Patient arrived to unit, was on nonrebreather when arrived, pulse oximetry showed 81% spo2. Placed on BIPAP 100% Fio2, pressure settings increased to 14/8 by RT.  
CHG bath completed, intensivist notified 7501: Orders received- blood work, including paired blood cultures sent. Patient reports being comfortable on BIPAP, able to talk, does not appear in distress however tachypnic 20s-30s 
 
0745: Bedside and Verbal shift change report given to Karina Holloway RN (oncoming nurse) by Michelle Matias RN (offgoing nurse). Report included the following information SBAR, Kardex, Procedure Summary, Intake/Output, MAR, Accordion, Recent Results and Cardiac Rhythm NSR.

## 2021-06-09 NOTE — PROGRESS NOTES
Spiritual Care Assessment/Progress Note [facility] 
 
 
NAME: Erica Parker      MRN: [id number] AGE: 39 y.o. SEX: male Caodaism Affiliation: Unknown Language: English  
 
6/9/2021     Total Time (in minutes): 40 Spiritual Assessment begun in [facility] 7S2 INTENSIVE CARE through conversation with: 
  
    []Patient        [] Family    [] Friend(s) Reason for Consult: Advance medical directive consult, Initial/Spiritual assessment, patient floor Spiritual beliefs: (Please include comment if needed) 
   [] Identifies with a ingris tradition:     
   [] Supported by a ingris community:        
   [] Claims no spiritual orientation:       
   [] Seeking spiritual identity:            
   [] Adheres to an individual form of spirituality:       
   [x] Not able to assess:                   
 
    
Identified resources for coping:  
   [] Prayer                           
   [] Music                  [] Guided Imagery 
   [] Family/friends                 [] Pet visits [] Devotional reading                         [x] Unknown 
   [] Other:                                          
 
 
Interventions offered during this visit: (See comments for more details) Plan of Care: 
 
 [] Support spiritual and/or cultural needs  
 [] Support AMD and/or advance care planning process    
 [] Support grieving process 
 [] Coordinate Rites and/or Rituals  
 [] Coordination with community clergy [] No spiritual needs identified at this time 
 [] Detailed Plan of Care below (See Comments)  [] Make referral to Music Therapy 
[] Make referral to Pet Therapy    
[] Make referral to Addiction services 
[] Make referral to [facility] 
[] Make referral to Spiritual Care Partner 
[] No future visits requested       
[x] Follow up upon further referrals Comments:  requested for spiritual assessment and Advance Directive (AMD) consult.   Patient's nurse paged the  to say that the patient is under Covid-19 isolation precautions and currently using bipap to help with breathing. Described feelings of being anxious about his health with concerns about having nobody in his life other than his long time life partner. Nichelle Bell it would bring a great deal of comfort to name her as his primary health agent on an AMD.  Patient requested information concerning AMD.  Nichelle Bell he would like to name his life partner, Makayla Odell, as his primary healthcare agent in the event his health should take a turn for the worse. Patient is under Covid-19 isolation precautions and using bipap which makes it difficult to communicate directly with him. This  is unable to enter the isolation room and patient unable to speak with bipap. Provided a blank copy of AMD, a copy of the booklet, \"Your Right to Decide, and information card describing availability of  and contact information. Asked patient's nurse to give this material to the patient upon her next visit to his room for his completion and to sign in front of nursing staff. Asked nurse to inform the patient that, should he desire assistance in completing the form, he should ask the nurse to contact the  for assistance and she agreed. Rev. Kiara Carter MDiv, [facility], [address]  paging service: [phone])

## 2021-06-09 NOTE — ED NOTES
Spoke with transfer center, they report patient has been assigned to bed 7112 bed 1 at [facility].  They report they are calling to arrange transport at this time.

## 2021-06-09 NOTE — ED NOTES
Patient departed ED via [id number] [facility] for transport to [facility] in stable condition.

## 2021-06-09 NOTE — PROGRESS NOTES
Verbal shift change report given to Christina (oncoming nurse) by Sweta Aquino (offgoing nurse). Report included the following information SBAR, Kardex, Intake/Output, MAR, Accordion and Recent Results. 0930 pt up to bedside commode. One episode of diarrhea. Pt began having coughing fit; bipap removed due to dry heaving and coughing up pink-tinged sputum. Sats decreased to 70s; bipap placed back on patient and sats quickly up to 90s. 1020 Sats in mid-high 80s. Encouraged side-lying position. Sats improved to mid 90s.

## 2021-06-09 NOTE — H&P
[facility] 
 
ICU TEAM History and Physical 
 
Name: Alex Silva : 1975 MRN: [id number] Date: 2021 Assessment:  
 
ICU Problems: 1. Acute Hypoxic Respiratory Failure 2 to COVID pneumonia 2. Hypokalemia 3. YVROSE likely Prerenal due to dehydration 4. Diarrhea 5. Lactic acidosis Past History 6. HTN 
7. Anxiety 8. Sleep Apnea - uses Cpap at night 9. Obesity ICU Comprehensive Plan of Care:  
 
Plans for this Shift:  
 
1. Admit to ICU 2. Bi-pap get ABG. 3. May use HFNC for meals if able to tolerate 4. Give 1 L LR bolus 5. Consult renal for YVROSE 6. COVID Treatment: 
a. Immunomodulator--Steroids -- Yes Decadron 6mg Daily 10 days 
b. Antiviral--Remdesivir -- Pending   
c. COVID-19 Specific Anticoagulation -- Yes Lovenox 
d. Vitamin C -- Yes   
e. Zinc -- Yes   
f. Melatonin -- Yes   
g. Antibiotics -- Azithromycin 
h. Ceftriaxone  7 days 7. SBP Goal of: > 90 mmHg 8. MAP Goal of: > 65 mmHg 9. None - For above SBP/MAP goals 10. IVFs: LR @ 100 ml/hr 11. Transfusion Trigger (Hgb): <7 g/dL 12. Respiratory Goals: 
a. N/A 
13. Pulmonary toilet: Duo-Nebs 14. SpO2 Goal: > 92% Bipap 15. Keep K>4; Mg>2 16. PT/OT: NA  
17. Discussed Plan of Care/Code Status: Full Code 18. Appreciate Consultants Input: Consider ID/Nephrology consult 19. Discussed Care Plan with Bedside RN 
20. Documentation of Current Medications 21. Rest of Plan Below: 
 
F - Feeding:  Yes full liq, if tolerate HFNC for meals A - Analgesia: Acetaminophen S - Sedation: Melatonin T - DVT Prophylaxis: Lovenox H - Head of Bed: > 30 Degrees U - Ulcer Prophylaxis: Pepcid (famotidine) G - Glycemic Control: PRN 
S - Spontaneous Breathing Trial: No 
B - Bowel Regimen: MiraLax I - Indwelling Catheter: 
 Tubes: None Lines: Peripheral IV Drains: None D - De-escalation of Antibiotics: Azithromycin Ceftriaxone Subjective:  
Progress Note: 2021 Reason for ICU Admission: Acute Respiratory Failure due to COVID  
 
HPI: 
Cl Nazario is a 39 y.o. Male who presented to [facility] ER with complaint of increasing shortness of breath. He had Covid 19 last year. He went on vacation to [facility] last week with his wife and developed diarrhea. He had fever and body aches. Developed a no productive cough and had nausea as well. On Wednesday he tested positive for Covid and was getting better. Today his wifer found him confused and he had an oxygen saturation of 72%. He was started on Bi-pap and given steroids and started on azithromycin. Transfer center was called and patient was transferred to [facility] ICU for higher level of care. Overnight Events:  
6/9/2021 POD: 
* No surgery found * S/P:  
 
 
Active Problem List:  
 
Problem List  Never Reviewed Codes Class Acute respiratory failure due to COVID-19 [facility]) ICD-10-CM: U07.1, J96.00 
ICD-9-CM: 518.81, 079.89 Past Medical History:  
 
 has a past medical history of Anxiety, Hypertension, and Sleep apnea. Past Surgical History:  
 
 has no past surgical history on file. Home Medications:  
 
Prior to Admission medications Medication Sig Start Date End Date Taking? Authorizing Provider  
lisinopriL (PRINIVIL, ZESTRIL) 10 mg tablet Take 10 mg by mouth daily. Florinda Artis MD  
hydroCHLOROthiazide (MICROZIDE) 12.5 mg capsule Take 12.5 mg by mouth daily. Florinda Artis MD  
escitalopram oxalate (Lexapro) 5 mg tablet Take 5 mg by mouth daily. Florinda Artis MD  
ergocalciferol (ERGOCALCIFEROL) 1,250 mcg (50,000 unit) capsule Take 50,000 Units by mouth every seven (7) days. Florinda Artis MD  
ondansetron hcl (Zofran) 4 mg tablet Take 4 mg by mouth every eight (8) hours as needed for Nausea or Vomiting. Florinda Artis MD  
 
 
Allergies/Social/Family History: No Known Allergies Social History Tobacco Use  Smoking status: Former Smoker Years: 15.00  Smokeless tobacco: Never Used Substance Use Topics  Alcohol use: Not Currently Family History Problem Relation Age of Onset  Asthma Mother  Cancer Mother  Diabetes Mother  Heart Disease Father  Hypertension Father Review of Systems: A comprehensive review of systems was negative except for: Constitutional: positive for fevers, chills and malaise Respiratory: positive for cough or pleurisy/chest pain Gastrointestinal: positive for nausea and diarrhea Objective:  
Vital Signs: 
Visit Vitals SpO2 94% O2 Device: BIPAP Temp (24hrs), Av.1 °F (37.8 °C), Min:99.6 °F (37.6 °C), Max:100.5 °F (38.1 °C) Intake/Output:  
No intake or output data in the 24 hours ending 21 4462 Physical Exam: 
General:  alert, cooperative, mild distress, appears stated age Eye:  conjunctivae/corneas clear. PERRL, EOM's intact. Neurologic:  oriented, CN II-XII intact Lymphatic:  Cervical, supraclavicular, and axillary nodes normal.  
Neck:  normal and no erythema or exudates noted. Lungs:  clear to auscultation bilaterally Heart:  regular rate and rhythm, S1, S2 normal, no murmur, click, rub or gallop Abdomen:  soft, non-tender. Bowel sounds normal. No masses,  no organomegaly Cardiovascular:  Regular rate and rhythm, S1S2 present, without murmur or extra heart sounds, pedal pulses normal and no edema Skin:  Normal. and no rash or abnormalities LABS AND  DATA: Personally reviewed Recent Labs  
  21 WBC 9.0 HGB 15.7 HCT 44.9  Recent Labs  
  21   
K 2.9*  
CL 96* CO2 24 BUN 26* CREA 1.85* * CA 8.3*  
MG 1.6 Recent Labs  
  21 AP 80  
TP 6.9 ALB 2.4*  
GLOB 4.5* Recent Labs  
  21 INR 1.1 PTP 11.1 No results for input(s): PHI, PCO2I, PO2I, FIO2I in the last 72 hours. Recent Labs  
  21 TROIQ 0.11* Hemodynamics:  
PAP:   CO:    
Wedge:   CI:    
CVP:    SVR:    
  PVR: Ventilator Settings: 
Mode Rate Tidal Volume Pressure FiO2 PEEP  
         100 % Peak airway pressure:     
Minute ventilation: 18.5 l/min MEDS: Reviewed Chest X-Ray: CXR Results  (Last 48 hours) 06/08/21 2207  XR CHEST PORT Final result Impression:  Extensive bilateral pulmonary infiltrates consistent with atypical  
or viral pneumonia including Covid 19. Narrative:  PROCEDURE: XR CHEST PORT  
   
HISTORY:Coded positive, short of breath COMPARISON:None TECHNIQUE: AP portable chest  
   
LIMITATIONS: None TUBES/LINES: None LUNG PARENCHYMA/PLEURA: There are extensive patchy alveolar and interstitial  
infiltrates throughout both lungs. Relative sparing of the apices. TRACHEA/BRONCHI: Normal  
PULMONARY VESSELS: Normal  
HEART: Normal  
MEDIASTINUM: No discrete mass BONE/SOFT TISSUES: No acute process OTHER: None CT Results  (Last 48 hours) None ECHO: Pending, once COVID negative Multidisciplinary Rounds Completed:  Pending ABCDEF Bundle/Checklist Completed: 
Yes SPECIAL EQUIPMENT Bipap DISPOSITION Stay in ICU CRITICAL CARE CONSULTANT NOTE I had a face to face encounter with the patient, reviewed and interpreted patient data including clinical events, labs, images, vital signs, I/O's, and examined patient. I have discussed the case and the plan and management of the patient's care with the consulting services, the bedside nurses and the respiratory therapist.   
 
NOTE OF PERSONAL INVOLVEMENT IN CARE This patient has a high probability of imminent, clinically significant deterioration, which requires the highest level of preparedness to intervene urgently. I participated in the decision-making and personally managed or directed the management of the following life and organ supporting interventions that required my frequent assessment to treat or prevent imminent deterioration. I personally spent 45 minutes of critical care time. This is time spent at this critically ill patient's bedside actively involved in patient care as well as the coordination of care and discussions with the patient's family. This does not include any procedural time which has been billed separately. Elle Vega [facility] Critical Care Medicine [facility]

## 2021-06-09 NOTE — ED PROVIDER NOTES
Patient  Brought to ER with complaint of increasing shortness of breath. He had Covid 19 last year. Kimberly Pate He went on vacation last week and developed diarrhea . He had fever and body aches. On Wednesday he tested positive for Covid and was getting better. Today his wifer found him confused and he had an oxygen saturation of 72%. Duration:  6 days Intensity: severe. Modified by: light activity Past Medical History:  
Diagnosis Date  Anxiety  Hypertension  Sleep apnea History reviewed. No pertinent surgical history. History reviewed. No pertinent family history. Social History Socioeconomic History  Marital status: Not on file Spouse name: Not on file  Number of children: Not on file  Years of education: Not on file  Highest education level: Not on file Occupational History  Not on file Tobacco Use  Smoking status: Never Smoker  Smokeless tobacco: Never Used Substance and Sexual Activity  Alcohol use: Not Currently  Drug use: Never  Sexual activity: Not on file Other Topics Concern  Not on file Social History Narrative  Not on file Social Determinants of Health Financial Resource Strain:  Difficulty of Paying Living Expenses:   
Food Insecurity:  Worried About [address] in the Last Year:   
[address] in the Last Year:   
Transportation Needs:   
 Lack of Transportation (Medical):  Lack of Transportation (Non-Medical): Physical Activity:   
 Days of Exercise per Week:  Minutes of Exercise per Session:   
Stress:  Feeling of Stress :   
Social Connections:  Frequency of Communication with Friends and Family:  Frequency of Social Gatherings with Friends and Family:  Attends Christian Services:  Active Member of Clubs or Organizations:  Attends Club or Organization Meetings:  Marital Status: Intimate Partner Violence:  Fear of Current or Ex-Partner:  Emotionally Abused:   
 Physically Abused:   
 Sexually Abused: ALLERGIES: Patient has no known allergies. Review of Systems Constitutional: Positive for chills, fatigue and fever. HENT: Negative. Eyes: Negative. Respiratory: Positive for cough and shortness of breath. Cardiovascular: Negative. Endocrine: Negative. Genitourinary: Negative. Musculoskeletal: Negative. Skin: Negative. Allergic/Immunologic: Negative. Neurological: Negative. Hematological: Negative. Psychiatric/Behavioral: Positive for confusion. Vitals:  
 06/08/21 2143 06/08/21 2146 BP: 96/64 Pulse: (!) 107 Resp: 30 Temp: (!) 100.5 °F (38.1 °C) SpO2: (!) 83% (!) 83% Physical Exam 
Vitals and nursing note reviewed. Constitutional:   
   Appearance: He is obese. He is ill-appearing and toxic-appearing. HENT:  
   Head: Normocephalic and atraumatic. Cardiovascular:  
   Rate and Rhythm: Normal rate and regular rhythm. Pulses: Normal pulses. Heart sounds: Normal heart sounds. Pulmonary:  
   Effort: Pulmonary effort is normal. Tachypnea present. Breath sounds: Examination of the right-upper field reveals rhonchi. Examination of the left-upper field reveals rhonchi. Rhonchi present. Abdominal:  
   General: Abdomen is flat. Bowel sounds are normal.  
   Palpations: Abdomen is soft. Musculoskeletal:     
   General: Normal range of motion. Cervical back: Normal range of motion and neck supple. Skin: 
   General: Skin is warm and dry. Neurological:  
   General: No focal deficit present. Mental Status: He is oriented to person, place, and time. Mental status is at baseline. Psychiatric:     
   Mood and Affect: Mood normal.     
   Behavior: Behavior normal.  
 
  
 
MDM Number of Diagnoses or Management Options Risk of Complications, Morbidity, and/or Mortality Presenting problems: high Diagnostic procedures: high Management options: high General comments: Discussed araceli Gregg at [facility] and she accepts patient as a transfer to the ICU. Critical Care Performed by: Sylvain Sifuentes MD 
Authorized by: Sylvain Sifuentes MD  
 
Critical care provider statement:  
  Critical care start time:  6/8/2021 9:50 PM 
  Critical care end time:  6/8/2021 11:22 PM 
  Critical care was necessary to treat or prevent imminent or life-threatening deterioration of the following conditions:  Respiratory failure and sepsis Critical care was time spent personally by me on the following activities:  Ordering and performing treatments and interventions, ordering and review of laboratory studies, ordering and review of radiographic studies and discussions with consultants

## 2021-06-10 NOTE — PROGRESS NOTES
[facility] 
 [address] [facility], [facility] Phone: [phone]   [phone]   
  
Nephrology Progress Note Claudene Barnes     1975     [id number] Date of Admission : 6/9/2021 
06/10/21 CC: Follow up for ARF Assessment and Plan YVROSE : 
- pre renal azotemia. Urine lytes: pre renal. US: normal   
- resolved w/ IVF - stopped LR to avoid risk of fluid overload  
- maintenance fluids if inadequate po intake 
- continue to hold Lisinopril and HCTZ 
- labs daily B/L COVID PNA  
- significant Hypoxia - On BIPAP  
- mx per CCM  
  
HTN  
- hold Lisinopril/ HCTZ for now - BP low but stable  
  
Morbid Obesity CARRIE 
  
Signing off Please call us back if any renal issues arise Interval History: 
Seen through glass doors Renal function better 3L UOP Stable oxygenation Echo not done Review of Systems: Review of systems not obtained due to patient factors. Current Medications:  
Current Facility-Administered Medications Medication Dose Route Frequency  insulin lispro (HUMALOG) injection   SubCUTAneous Q6H  
 loperamide (IMODIUM) capsule 2 mg  2 mg Oral Q4H PRN  
 sodium chloride (NS) flush 5-40 mL  5-40 mL IntraVENous Q8H  
 sodium chloride (NS) flush 5-40 mL  5-40 mL IntraVENous PRN  
 acetaminophen (TYLENOL) tablet 650 mg  650 mg Oral Q6H PRN Or  
 acetaminophen (TYLENOL) suppository 650 mg  650 mg Rectal Q6H PRN  polyethylene glycol (MIRALAX) packet 17 g  17 g Oral DAILY PRN  
 ondansetron (ZOFRAN ODT) tablet 4 mg  4 mg Oral Q8H PRN Or  
 ondansetron (ZOFRAN) injection 4 mg  4 mg IntraVENous Q6H PRN  
 enoxaparin (LOVENOX) injection 40 mg  40 mg SubCUTAneous Q12H  
 famotidine (PF) (PEPCID) 20 mg in 0.9% sodium chloride 10 mL injection  20 mg IntraVENous BID  escitalopram oxalate (LEXAPRO) tablet 5 mg  5 mg Oral DAILY  dexamethasone (DECADRON) 4 mg/mL injection 6 mg  6 mg IntraVENous Q24H  
 ascorbic acid (vitamin C) (VITAMIN C) tablet 500 mg  500 mg Oral DAILY  zinc sulfate (ZINCATE) 50 mg zinc (220 mg) capsule 1 Capsule  1 Capsule Oral DAILY  cholecalciferol (VITAMIN D3) (1000 Units /25 mcg) tablet 2,000 Units  2,000 Units Oral DAILY  cefTRIAXone (ROCEPHIN) 1 g in 0.9% sodium chloride (MBP/ADV) 50 mL MBP  1 g IntraVENous Q24H  
 azithromycin (ZITHROMAX) 500 mg in 0.9% sodium chloride 250 mL (VIAL-MATE)  500 mg IntraVENous Q24H  
 melatonin tablet 6 mg  6 mg Oral QHS PRN  
 glucose chewable tablet 16 g  4 Tablet Oral PRN  
 dextrose (D50W) injection syrg 12.5-25 g  12.5-25 g IntraVENous PRN  
 glucagon (GLUCAGEN) injection 1 mg  1 mg IntraMUSCular PRN  
 dexmedeTOMidine in 0.9 % NaCl (PRECEDEX) 400 mcg/100 mL (4 mcg/mL) infusion soln  0.1-1.5 mcg/kg/hr IntraVENous TITRATE  guaiFENesin-codeine (ROBITUSSIN AC) 100-10 mg/5 mL solution 5 mL  5 mL Oral Q4H PRN  phenol throat spray (CHLORASEPTIC) 1 Spray  1 Spray Oral PRN No Known Allergies Objective: 
Vitals:   
Vitals:  
 06/10/21 0100 06/10/21 0200 06/10/21 0400 06/10/21 0500 BP: 110/62 113/73 (!) 97/58 114/70 Pulse: 68 73 62 67 Resp: (!) 34 (!) 38 (!) 37 (!) 31 Temp:   98.4 °F (36.9 °C) SpO2: 94% (!) 89% 96% 97% Weight:      
Height:      
 
Intake and Output: 
No intake/output data recorded. 06/08 1901 - 06/10 0700 In: 3318.8 [P.O.:450; I.V.:2868.8] Out: 2975 [Urine:2975] Physical Exam: examined through glass doors General:  Obese, SOB at rest, BIPAP HEENT: NAD Lungs : symmetrical chest movements, increaed WOB 
CVS: RRR on monitor Abdomen: obese, distended Extremities: no Edema Skin: No rash or lesions. Neurologic: unable to assess Psych:  Unable to assess 
 
[]    High complexity decision making was performed 
[]    Patient is at high-risk of decompensation with multiple organ involvement Lab Data Personally Reviewed: I have reviewed all the pertinent labs, microbiology data and radiology studies during assessment. Recent Labs  
  06/10/21 
0308 06/09/21 
1408 06/09/21 
0603 06/08/21 
2140  134* 135* 136  
K 3.6 3.6 3.5 2.9*  
 101 99 96* CO2 27 27 25 24 * 240* 236* 196* BUN 22* 24* 30* 26* CREA 0.80 0.87 1.33* 1.85* CA 8.8 8.5 8.4* 8.3*  
MG  --   --  2.1 1.6 PHOS  --   --  3.4  --   
ALB  --  2.0* 2.4* 2.4* ALT  --  86* 107* 124* INR  --   --   --  1.1 Recent Labs  
  06/10/21 
0308 06/09/21 
0603 06/08/21 
2140 WBC 5.1 7.3 9.0 HGB 14.2 14.9 15.7 HCT 42.5 43.2 44.9  196 185 No results found for: SDES Lab Results Component Value Date/Time Culture result: NO GROWTH AFTER 22 HOURS 06/09/2021 06:06 AM  
 
Recent Results (from the past 24 hour(s)) URINALYSIS W/MICROSCOPIC Collection Time: 06/09/21  9:41 AM  
Result Value Ref Range Color YELLOW/STRAW Appearance CLEAR CLEAR Specific gravity 1.019 1.003 - 1.030    
 pH (UA) 5.0 5.0 - 8.0 Protein 30 (A) NEG mg/dL Glucose 100 (A) NEG mg/dL Ketone TRACE (A) NEG mg/dL Bilirubin Negative NEG Blood SMALL (A) NEG Urobilinogen 1.0 0.2 - 1.0 EU/dL Nitrites Negative NEG Leukocyte Esterase Negative NEG    
 WBC 0-4 0 - 4 /hpf  
 RBC 0-5 0 - 5 /hpf Epithelial cells FEW FEW /lpf Bacteria 1+ (A) NEG /hpf SODIUM, UR, RANDOM Collection Time: 06/09/21  9:41 AM  
Result Value Ref Range Sodium,urine random 14 MMOL/L  
CREATININE, UR, RANDOM Collection Time: 06/09/21  9:41 AM  
Result Value Ref Range Creatinine, urine 126.00 mg/dL CHLORIDE, URINE RANDOM Collection Time: 06/09/21  9:41 AM  
Result Value Ref Range Chloride,urine random <10 MMOL/L  
GLUCOSE, POC Collection Time: 06/09/21 12:21 PM  
Result Value Ref Range Glucose (POC) 233 (H) 65 - 117 mg/dL Performed by Livan Diaz METABOLIC PANEL, COMPREHENSIVE Collection Time: 06/09/21  2:08 PM  
Result Value Ref Range Sodium 134 (L) 136 - 145 mmol/L  Potassium 3.6 3.5 - 5.1 mmol/L Chloride 101 97 - 108 mmol/L  
 CO2 27 21 - 32 mmol/L Anion gap 6 5 - 15 mmol/L Glucose 240 (H) 65 - 100 mg/dL BUN 24 (H) 6 - 20 MG/DL Creatinine 0.87 0.70 - 1.30 MG/DL  
 BUN/Creatinine ratio 28 (H) 12 - 20 GFR est AA >60 >60 ml/min/1.73m2 GFR est non-AA >60 >60 ml/min/1.73m2 Calcium 8.5 8.5 - 10.1 MG/DL Bilirubin, total 0.3 0.2 - 1.0 MG/DL  
 ALT (SGPT) 86 (H) 12 - 78 U/L  
 AST (SGOT) 97 (H) 15 - 37 U/L Alk. phosphatase 72 45 - 117 U/L Protein, total 5.9 (L) 6.4 - 8.2 g/dL Albumin 2.0 (L) 3.5 - 5.0 g/dL Globulin 3.9 2.0 - 4.0 g/dL A-G Ratio 0.5 (L) 1.1 - 2.2 GLUCOSE, POC Collection Time: 06/09/21  4:10 PM  
Result Value Ref Range Glucose (POC) 232 (H) 65 - 117 mg/dL Performed by [employer] GLUCOSE, POC Collection Time: 06/09/21 10:37 PM  
Result Value Ref Range Glucose (POC) 219 (H) 65 - 117 mg/dL Performed by Albina Prince METABOLIC PANEL, BASIC Collection Time: 06/10/21  3:08 AM  
Result Value Ref Range Sodium 138 136 - 145 mmol/L Potassium 3.6 3.5 - 5.1 mmol/L Chloride 103 97 - 108 mmol/L  
 CO2 27 21 - 32 mmol/L Anion gap 8 5 - 15 mmol/L Glucose 237 (H) 65 - 100 mg/dL BUN 22 (H) 6 - 20 MG/DL Creatinine 0.80 0.70 - 1.30 MG/DL  
 BUN/Creatinine ratio 28 (H) 12 - 20 GFR est AA >60 >60 ml/min/1.73m2 GFR est non-AA >60 >60 ml/min/1.73m2 Calcium 8.8 8.5 - 10.1 MG/DL  
CBC WITH AUTOMATED DIFF Collection Time: 06/10/21  3:08 AM  
Result Value Ref Range WBC 5.1 4.1 - 11.1 K/uL  
 RBC 4.70 4. 10 - 5.70 M/uL  
 HGB 14.2 12.1 - 17.0 g/dL HCT 42.5 36.6 - 50.3 % MCV 90.4 80.0 - 99.0 FL  
 MCH 30.2 26.0 - 34.0 PG  
 MCHC 33.4 30.0 - 36.5 g/dL  
 RDW 13.1 11.5 - 14.5 % PLATELET 033 757 - 829 K/uL MPV 10.3 8.9 - 12.9 FL  
 NRBC 0.0 0  WBC ABSOLUTE NRBC 0.00 0.00 - 0.01 K/uL NEUTROPHILS 91 (H) 32 - 75 % LYMPHOCYTES 5 (L) 12 - 49 % MONOCYTES 4 (L) 5 - 13 %  EOSINOPHILS 0 0 - 7 % BASOPHILS 0 0 - 1 % IMMATURE GRANULOCYTES 0 %  
 ABS. NEUTROPHILS 4.6 1.8 - 8.0 K/UL  
 ABS. LYMPHOCYTES 0.3 (L) 0.8 - 3.5 K/UL  
 ABS. MONOCYTES 0.2 0.0 - 1.0 K/UL  
 ABS. EOSINOPHILS 0.0 0.0 - 0.4 K/UL  
 ABS. BASOPHILS 0.0 0.0 - 0.1 K/UL  
 ABS. IMM. GRANS. 0.0 K/UL  
 DF MANUAL PLATELET COMMENTS Large Platelets RBC COMMENTS ANISOCYTOSIS 1+ SAMPLES BEING HELD Collection Time: 06/10/21  3:08 AM  
Result Value Ref Range SAMPLES BEING HELD 1red,1blu COMMENT Add-on orders for these samples will be processed based on acceptable specimen integrity and analyte stability, which may vary by analyte. GLUCOSE, POC Collection Time: 06/10/21  5:46 AM  
Result Value Ref Range Glucose (POC) 222 (H) 65 - 117 mg/dL Performed by Nabeel Orellana Total time spent with patient:  xxx   min. Care Plan discussed with: 
Patient Family RN Consulting [facility],      
 
I have reviewed the flowsheets. Chart and Pertinent Notes have been reviewed. No change in PMH ,family and social history from Consult note.  
 
 
Rozina Barkley MD

## 2021-06-10 NOTE — PROGRESS NOTES
[facility] 
 
ICU TEAM Progress Note Name: Fanny Salazar : 1975 MRN: [id number] Date: 6/10/2021 Assessment:  
Reason for ICU Admission: Covid-19 pneumonia Brief HPI: 39 y.o. Male who presented to [facility] ER with complaint of increasing shortness of breath. He had Covid 19 last year. He went on vacation to [facility] last week with his wife and developed diarrhea. He had fever and body aches. On Wednesday he tested positive for Covid and was getting better. Today his wifer found him confused and he had an oxygen saturation of 72%. He was started on Bi-pap and given steroids and started on azithromycin.  
 
- Acute Hypoxic Respiratory Failure - Hypokalemia - YVROSE 
- Diarrhea - Lactic Acidosis - Hypertension - Anxiety - Sleep Apnea - Obesity Plan:  
 
Neuro: no acute issue Pulm: continue bipap. Prone as able. Supplemental O2. Goal SpO2 >90% Cardiac: prn antihypertensives. MAP goal >65 Renal: daily bmp. Avoid nephrotoxins GI: Place DHT for meds/TFs. pepcid ID: COVID tx as below Heme: Lovenox, daily labs Endo: SSI MSK: SCDs, prone as able for pna COVID Treatment: 
a. Decadron 
b. Vitamin C 
c. Vitamin D 
d. Zinc  
e. Lovenox 
f. Azithromycin 
g. Ceftriaxone F - Feeding:  Start TFs once DHT in place A - Analgesia: Acetaminophen S - Sedation: Melatonin T - DVT Prophylaxis: Lovenox H - Head of Bed: > 30 Degrees U - Ulcer Prophylaxis: Pepcid (famotidine) G - Glycemic Control: PRN 
S - Spontaneous Breathing Trial: No 
B - Bowel Regimen: MiraLax I - Indwelling Catheter: 
            Tubes: None Lines: Peripheral IV Drains: None D - De-escalation of Antibiotics: Azithromycin Ceftriaxone Subjective:  
Overnight Events:  
6/10/2021 No acute events Objective:  
 
Visit Vitals /70 Pulse 67 Temp 98.4 °F (36.9 °C) Resp (!) 31 Ht 5' 7\" (1.702 m) Comment: From documentation on 21 Wt 128.3 kg (282 lb 13.6 oz) SpO2 97% BMI 44.30 kg/m² O2 Device: BIPAP Temp (24hrs), Av.4 °F (36.9 °C), Min:98.1 °F (36.7 °C), Max:98.6 °F (37 °C) Intake/Output:  
 
Intake/Output Summary (Last 24 hours) at 6/10/2021 5768 Last data filed at 6/10/2021 8288 Gross per 24 hour Intake 2318.75 ml Output 2975 ml Net -656.25 ml Physical Exam: 
General:  alert, cooperative, mild distress, appears stated age Eye:  conjunctivae/corneas clear. PERRL, EOM's intact. Neurologic:  oriented, CN II-XII intact Neck:  normal and no erythema or exudates noted. Lungs:  ronchi b/l. Heart:  regular rate and rhythm, no murmur, click, rub or gallop Abdomen:  soft, non-tender. Skin:  Normal. and no rash or abnormalities Labs & Data: Reviewed Medications: Reviewed Chest X-Ray : Extensive bilateral pulmonary infiltrates consistent with atypical or viral pneumonia including Covid 19. TTE: Pending Multidisciplinary Rounds Completed:  Pending ABCDEF Bundle/Checklist Completed: Yes DISPOSITION: Stay in ICU CRITICAL CARE CONSULTANT NOTE I had a face to face encounter with the patient, reviewed and interpreted patient data including clinical events, labs, images, vital signs, I/O's, and examined patient. I have discussed the case and the plan and management of the patient's care with the consulting services, the bedside nurses and the respiratory therapist.   
 
NOTE OF PERSONAL INVOLVEMENT IN CARE This patient has a high probability of imminent, clinically significant deterioration, which requires the highest level of preparedness to intervene urgently. I participated in the decision-making and personally managed or directed the management of the following life and organ supporting interventions that required my frequent assessment to treat or prevent imminent deterioration. I personally spent 50 minutes of critical care time.   This is time spent at this critically ill patient's bedside actively involved in patient care as well as the coordination of care and discussions with the patient's family. This does not include any procedural time which has been billed separately. Rj Brock MD 
Staff Intensivist/Anesthesiologist 
[facility] 6/10/2021

## 2021-06-10 NOTE — PROGRESS NOTES
Reviewed chart for transitions of care, and discussed in rounds. Patient is [place] spoke with wife via phone to explain role and offer support. Patient is alert and oriented x4, and confirmed demographics. Baseline:  
ADLs/IDALS:Independent Previous Home Health:none Previous SNF/IPR:None ER Contact: Maame Rivero [phone] ([facility]) Patient lives in a single level house with 4 steps to enter. Patient is independent with ADLs/IDALs Patient has no previous home health or equipment needs. Patient's preferred pharmacy is [facility] located in [place] Patient's MPOA is expected to transport at discharge. Reason for Admission:  COVID 
 
                
RUR Score:    13% Plan for utilizing home health:  TBD     
 
PCP: First and Last name:  Allison Castillo NP Name of Practice: [facility] Are you a current patient: Yes/No: Yes Approximate date of last visit: unknown Can you participate in a virtual visit with your PCP: Yes Current Advanced Directive/Advance Care Plan: Full Code Healthcare Decision Maker:  
Click here to complete [address] including selection of the Healthcare Decision Maker Relationship (ie \"Primary\") Primary Decision Maker: Kaialejandro Yanet - Life Partner - [phone] Patient is on isolation for COVID, CM spoke with Jarrell Giordano to introduce self and explain role. Patient is independent , works as a Travel Nurse. Patient uses a c pap at night , no other DME. Confirmed demographic information. Care management will follow for transitions of care. Mireya Delvalle RN,Care Management Care Management Interventions PCP Verified by CM: Yes Allison Castillo NP) MyChart Signup: No 
Discharge Durable Medical Equipment: No 
Physical Therapy Consult: No 
Occupational Therapy Consult: No 
Speech Therapy Consult: No 
Current Support Network:  Maame Rivero [facility] [phone])

## 2021-06-10 NOTE — PROGRESS NOTES
Nursing Shift Note 1930-0800 1940: Bedside and Verbal shift change report given to Natalie Goodwin RN (oncoming nurse) by Flory Tapia RN (offgoing nurse). Report included the following information SBAR, Kardex, Procedure Summary, Intake/Output, MAR, Accordion, Recent Results and Cardiac Rhythm NSR.  
 
2030: Patient complaining of persisting coughing causing SOB and incontinence and extremely soar throat. Received orders for robitussin AC and chloraseptic spray 2230: Assisted patient in completing his power of /living will paperwork. Patient apointing life partner Wilfredo Gee to be his surrogate decision maker. Documents placed in patient paper chart. Spoke to partner Falguni on phone and gave update. 2330: Encouraged patient to lay as far over on stomach as possible, educated on role in opening airways and preventing ARDS. Patient in full left side lying position 0345: Patient oxygen saturation dropping as low as 86%. Assisted patient back into side lying position from supine. 56: Patient has had three incontinent episodes of liquid, mucousy stool overnight, asking about imodium. Encouraged patient to lay back on side d/t oxygen returning to 88% 0745: Bedside and Verbal shift change report given to Pollo Goodwin RN (oncoming nurse) by Natalie Goodwin RN (offgoing nurse). Report included the following information SBAR, Kardex, Procedure Summary, Intake/Output, MAR, Accordion, Recent Results and Cardiac Rhythm NSR.

## 2021-06-10 NOTE — ACP (ADVANCE CARE PLANNING)
Advance Care Planning Advance Care Planning Inpatient Note [facility] [address]  Spiritual Care Department Today's Date: 6/10/2021 Unit: [facility] Received request from patient. Upon review of chart and communication with care team, patient's decision making abilities are not in question. Patient was/were present in the room during visit. Goals of ACP Conversation: 
Discuss Advance Care planning documents Health Care Decision Makers:   
  Primary Decision Maker: Pawan Eisenberg - Life Partner - [phone] Click here to complete [address] including selection of the Healthcare Decision Maker Relationship (ie \"Primary\") Today we: 
Verified Documents Updated Documents Verified Healthcare Decision Maker Updated Healthcare Decision Maker Advance Care Planning Documents (Patient Wishes) on file: 
Healthcare Power of /Advance Directive appointment of Health care agent Living Will/ Advance Directive Assessment:   
 
 follow up for Advance Directive (AMD) consult. Review of AMD document scanned last night determined the form was not completed. New AMD accomplished indicating patient's desires and given to the patient's nurse to be signed and witnessed before two witnesses. Asked staff to contact the  once the document was completed and signed by the patient in front of two witnesses. Interventions: 
Reviewed ACP document(s) on record for proper execution and need for updating Provided education on documents for clarity and greater understanding Assisted in the completion of documents according to patient's wishes at this time Reviewed but did not complete ACP document Outcomes/Plan: 
ACP Discussion Postposed Electronically signed by Biju Dumont [facility] on 6/10/2021 at 1:18 PM

## 2021-06-10 NOTE — PROGRESS NOTES
Spiritual Care Assessment/Progress Note [address] 
 
 
NAME: Vinny Galindo      MRN: [id number] AGE: 39 y.o. SEX: male Confucianism Affiliation: Unknown Language: [place] 6/10/2021     Total Time (in minutes): 46  
 
Spiritual Assessment begun in [address] through conversation with: 
  
    []Patient        [] Family    [] Friend(s) Reason for Consult: Advance medical directive consult Spiritual beliefs: (Please include comment if needed) 
   [] Identifies with a ingris tradition:     
   [] Supported by a ingris community:        
   [] Claims no spiritual orientation:       
   [] Seeking spiritual identity:            
   [] Adheres to an individual form of spirituality:       
   [x] Not able to assess:                   
 
    
Identified resources for coping:  
   [] Prayer                           
   [] Music                  [] Guided Imagery 
   [] Family/friends                 [] Pet visits [] Devotional reading                         [x] Unknown 
   [] Other:                                          
 
 
Interventions offered during this visit: (See comments for more details) Family/Friend(s):  (Patient's nurse) Plan of Care: 
 
 [] Support spiritual and/or cultural needs  
 [] Support AMD and/or advance care planning process    
 [] Support grieving process 
 [] Coordinate Rites and/or Rituals  
 [] Coordination with community clergy [] No spiritual needs identified at this time 
 [] Detailed Plan of Care below (See Comments)  [] Make referral to Music Therapy 
[] Make referral to Pet Therapy    
[] Make referral to Addiction services 
[] Make referral to [facility] 
[] Make referral to Spiritual Care Partner 
[] No future visits requested       
[x] Follow up upon further referrals Comments: follow up for Advance Directive (AMD) consult. Review of AMD document scanned last night determined the form was not completed.   New AMD accomplished indicating patient's desires and given to the patient's nurse to be signed and witnessed before two witnesses. Asked staff to contact the  once the document was completed and signed by the patient in front of two witnesses. Rev. Amaris Mercado MDiv, [facility], [facility]  paging service: [phone])

## 2021-06-11 NOTE — ANESTHESIA POSTPROCEDURE EVALUATION
Procedure(s): VA ECMO INSERTION WITH RIGHT AXILLARY CUTDOWN. LAMONTE BY DR. Felix Brown. Estella Vogel No value filed. Anesthesia Post Evaluation Multimodal analgesia: multimodal analgesia used between 6 hours prior to anesthesia start to PACU discharge Patient location during evaluation: ICU Note status: Sedated. Post-procedure mental status: Sedated. Pain management: adequate Airway patency: patent Anesthetic complications: no 
Cardiovascular status: acceptable (Supported) Respiratory status: acceptable (ECMO) Hydration status: acceptable Comments: Critical case performed in room, continued care as per primary team 
Post anesthesia nausea and vomiting:  none INITIAL Post-op Vital signs:  
Vitals Value Taken Time /84 06/11/21 1315 Temp Pulse 142 06/11/21 1327 Resp 20 06/11/21 1327 SpO2 66 % 06/11/21 1317 Vitals shown include unvalidated device data.

## 2021-06-11 NOTE — ANESTHESIA PROCEDURE NOTES
Central Line Placement Performed by: Danica Neumann DO Authorized by: Danica Neumann DO Indications: vascular access (surgeon requested for VV ecmo ) Preanesthetic Checklist: patient identified, risks and benefits discussed, anesthesia consent, site marked, patient being monitored and timeout performed Pre-procedure: All elements of maximal sterile barrier technique followed? Yes   
2% Chlorhexidine for cutaneous antisepsis, Hand hygiene performed prior to catheter insertion and Ultrasound guidance Sterile Ultrasound Technique followed?: Yes Procedure:  
Prep:  Chlorhexidine Orientation:  Right Patient position:  Trendelenburg Catheter type:  Single lumen Catheter size:  9 Fr Catheter length:  10 cm Number of attempts:  1 Successful placement: Yes Assessment:  
Post-procedure:  Catheter secured and sterile dressing applied Assessment:  Blood return through all ports and free fluid flow Insertion:  Uncomplicated Patient tolerance:  Patient tolerated the procedure well with no immediate complications

## 2021-06-11 NOTE — PERIOP NOTES
VA ECMO insertion performed at bedside in 7th floor ICU. Two pieces of quick clot intentionally left in right axillary incision.

## 2021-06-11 NOTE — PROGRESS NOTES
Physician Progress Note Mady Denis 
CSN #:                  [id number] :                       1975 ADMIT DATE:       2021 4:58 AM 
DISCH DATE: 
RESPONDING 
PROVIDER #:        Modesta Bello MD 
 
 
 
 
QUERY TEXT: 
 
Sylvester Robertson, 
 
Pt admitted with COVID-19 and noted to have 100.5F 107 30 96/64 83% on NRB  presenting to [facility] ED. Lactic acid 2.3 CRP 9.5  Procal 1.45 WBC WNL. If possible, please document in progress notes and discharge summary if you are evaluating and/or treating: The medical record reflects the following: 
Risk Factors: 45M BMI 44 Covid PCR + Clinical Indicators: 
100.5F 107 30 96/64 83% on NRB  presenting to [facility] ED. Lactic acid 2.3 CRP 9.5  Procal 1.45 WBC WNL.  [facility] ED Provider Note Umair White MD 
Critical care was necessary to treat or prevent imminent or life-threatening deterioration of the following conditions:  Respiratory failure and sepsis General comments: Discussed with NP [facility] and she accepts patient as a transfer to the ICU. Treatment: Transferred via air from outside facility, IVF bolus, IV abx, lab monitoring Thank you Valentin Whittington BSN RN CRCR Clinical Documentation Improvement [phone] Options provided: 
-- Sepsis present on admission due to COVID-19 infection -- Sepsis not present on admission due to COVID-19 infection -- Sepsis ruled out 
-- Other - I will add my own diagnosis -- Disagree - Not applicable / Not valid -- Disagree - Clinically unable to determine / Unknown 
-- Refer to Clinical Documentation Reviewer PROVIDER RESPONSE TEXT: 
 
This patient has sepsis which was present on admission due to COVID-19 infection.  
 
Query created by: Angella Galeana on 2021 4:03 PM 
 
 
Electronically signed by:  Modesta Bello MD 2021 7:11 PM

## 2021-06-11 NOTE — PROCEDURES
[facility] Procedure Note - Arterial Access:  
Performed by Maggie Stewart MD. Diagnosis: ARDS Insertion Date: 6/11/21 Time: 0900 Obtained Consent? yes; emergent Procedure Location:  ICU. Immediately prior to the procedure, the patient was reevaluated and found suitable for the planned procedure and any planned medications. Immediately prior to the procedure a time out was called to verify the correct patient, procedure, equipment, staff, and marking as appropriate. Central line Bundle: 
Full sterile barrier precautions used. 5 mL 1% Lidocaine placed at insertion site. Method: Seldinger technique. Site Prep: ChloraPrep. Procedure: Arterial Catheter Insertion in Left, Radial Artery Catheter inserted into a new site. Number of Attempts:  1 Indication: Monitoring and Blood Drawing. There was bright red, pulsatile blood return. Femoral Site? no. If Yes, reason femoral site was chosen:  
Catheter secured. Biopatch in place? yes. Sterile Bio-occlusive dressing placed. Complication None. The procedure was tolerated well. Maggie Stewart MD  
Critical Care Medicine [facility]

## 2021-06-11 NOTE — BRIEF OP NOTE
BRIEF POSTOPERATIVE NOTE Patient: Amandeep Fierro YOB: 1975 MRN: [id number] Pre-Op Diagnosis: post op hemorrhage Post-Op Diagnosis: post op hemorrhage Procedure:  
Reexploration of Axillary Cutdown Incision, Control of Hemorrhage Surgeon: Anita Vela MD 
 
Assistant(s): SILVIA Lubin Anesthesia: General  
 
Infusions/Support: Amiodarone, precedex, insulin, johnny, levo, vaso, epi, dobut, VA ECMO Estimated Blood Loss (mL): 3 L Cell Saver (mL): 0 Specimens: * No specimens in log * Drains and pacing wires: none Complications: patient  Findings: see full op note Implants: * No surgical log found * Electronically Signed by SLIVIA Lubin on 21 at 3:54 PM

## 2021-06-11 NOTE — ANESTHESIA PREPROCEDURE EVALUATION
Relevant Problems No relevant active problems Anesthetic History No history of anesthetic complications Review of Systems / Medical History Patient summary reviewed, nursing notes reviewed and pertinent labs reviewed Pulmonary Sleep apnea Pneumonia Comments: Intubates for respiratory failure Neuro/Psych Psychiatric history Comments: Sedated Cardiovascular Hypertension Exercise tolerance: <4 METS Comments: Hypotension GI/Hepatic/Renal 
Within defined limits Endo/Other Morbid obesity Other Findings Physical Exam 
 
Airway Neck ROM: short neck Intubated Cardiovascular Rhythm: regular Rate: abnormal 
 
 
 
Comments: Tachycardia  Dental 
No notable dental hx Pulmonary Decreased breath sounds: bilateral 
 
 
 
 
 Abdominal 
GI exam deferred Other Findings Anesthetic Plan ASA: 5, emergent Anesthesia type: total IV anesthesia Monitoring Plan: Arterial line and LAMONTE Post procedure ventilation Induction: Intravenous No family present Emergency case

## 2021-06-11 NOTE — PROGRESS NOTES
[facility] 
 
ICU TEAM Progress Note Name: Vinny Galindo : 1975 MRN: [id number] Date: 2021 Assessment:  
Reason for ICU Admission: Covid-19 pneumonia Brief HPI: 39 y.o. Male who presented to [facility] ER with complaint of increasing shortness of breath. He had Covid 19 last year. He went on vacation to [facility] last week with his wife and developed diarrhea. He had fever and body aches. On Wednesday he tested positive for Covid and was getting better. Today his wifer found him confused and he had an oxygen saturation of 72%. He was started on Bi-pap and given steroids and started on azithromycin.  
 
- Acute Hypoxic Respiratory Failure - ARDS 
- Hypokalemia - YVROSE 
- Diarrhea - Lactic Acidosis - Hypertension - Anxiety - Sleep Apnea - Obesity Plan:  
 
Neuro: sedation with prop/fentanyl. Nimbex. Pulm: mech ventilation. High peep, low TV. On 100% FiO2. MAAME. Paralytics. ECMO cx to CTS. Cardiac: Hypotensive once arterial line transduced. MAP goal >65. Norepi and vaso gtts ordered. Renal: daily bmp. Sodium bicarb bolus now for acidemia. Bolus LR. GI: OGT. Pending TFs. Pepcid. ID: COVID tx as below Heme: Lovenox, daily labs Endo: SSI. Steroid induced hyperglycemia. MSK: SCDs. Nimbex COVID Treatment: 
a. Decadron 
b. Vitamin C 
c. Vitamin D 
d. Zinc  
e. Lovenox 
f. Azithromycin 
g. Ceftriaxone F - Feeding:  Start TFs once DHT in place A - Analgesia: Acetaminophen S - Sedation: Melatonin T - DVT Prophylaxis: Lovenox H - Head of Bed: > 30 Degrees U - Ulcer Prophylaxis: Pepcid (famotidine) G - Glycemic Control: PRN 
S - Spontaneous Breathing Trial: No 
B - Bowel Regimen: MiraLax I - Indwelling Catheter: 
            Tubes: None Lines: Peripheral IV Drains: None D - De-escalation of Antibiotics: Azithromycin Ceftriaxone Subjective:  
Overnight Events:  
2021 Repeated episodes of agitation.  Patient removed bipap mask several times with subsequent desaturations. He was intubated around 0645. ABG on 100%FiO2, PEEP 14, RR20:  7.1/69/44/21. Helder started with no appreciable improvement in SpO2. Poor candidate for proning given tenuous oxygenation status and habitus. ECMO consult placed. Life partner updated. Objective:  
 
Visit Vitals /80 Pulse (!) 120 Temp 97.7 °F (36.5 °C) Resp 8 Ht 5' 7\" (1.702 m) Comment: From documentation on 21 Wt 128 kg (282 lb 3 oz) SpO2 (!) 37% BMI 44.20 kg/m² O2 Device: BIPAP Temp (24hrs), Av.9 °F (36.6 °C), Min:97.6 °F (36.4 °C), Max:98.3 °F (36.8 °C) Intake/Output:  
 
Intake/Output Summary (Last 24 hours) at 2021 0751 Last data filed at 2021 0500 Gross per 24 hour Intake 474.28 ml Output 1000 ml Net -525.72 ml Physical Exam: 
General: intubated, sedated, paralyzed Eye:  conjunctivae/corneas clear. Neurologic: unable to assess Neck:  normal and no erythema or exudates noted. Lungs:  ronchi b/l. Distant ventilator sounds Heart:  regular rhythm, tachycardic to 120s Abdomen:  soft, non-tender. Skin:  Normal. and no rash or abnormalities Labs & Data: Reviewed Medications: Reviewed Chest X-Ray : Extensive bilateral pulmonary infiltrates consistent with atypical or viral pneumonia including Covid 19. TTE: Pending (order placed ) Multidisciplinary Rounds Completed:  Pending ABCDEF Bundle/Checklist Completed: Yes DISPOSITION: Stay in ICU CRITICAL CARE CONSULTANT NOTE I had a face to face encounter with the patient, reviewed and interpreted patient data including clinical events, labs, images, vital signs, I/O's, and examined patient. I have discussed the case and the plan and management of the patient's care with the consulting services, the bedside nurses and the respiratory therapist.   
 
NOTE OF PERSONAL INVOLVEMENT IN CARE This patient has a high probability of imminent, clinically significant deterioration, which requires the highest level of preparedness to intervene urgently. I participated in the decision-making and personally managed or directed the management of the following life and organ supporting interventions that required my frequent assessment to treat or prevent imminent deterioration. I personally spent 240 minutes of critical care time. This is time spent at this critically ill patient's bedside actively involved in patient care as well as the coordination of care and discussions with the patient's family. This does not include any procedural time which has been billed separately. Aminata Barber MD 
Staff Intensivist/Anesthesiologist 
[facility] 6/11/2021

## 2021-06-11 NOTE — DEATH NOTE
Patient is s/p VA ECMO c/b post op hemorrhage and worsening shock. During reexploration, unable to gain control of the bleeding due to severe coagulopathy and likely DIC. Despite maximal surgical efforts as well as inotropic/pressor support & VA ECMO, unable to maintain acceptable hemodynamics. Decision was made to compassionately terminate further efforts at resuscitation. Patient slowly declined into PEA. MAP 14 and nonpulsatile on arterial line. Pupils fixed/dilated. No signs of spontaneous cardiac activity or respiratory effort. No response to painful or verbal stimuli. Time of death was called by Dr. Ofe Sheehan at 3960. Preliminary cause of death is cardiogenic/septic/hypovolemic shock. See full discharge summary by primary service for complete hospital course & other findings.

## 2021-06-11 NOTE — PROGRESS NOTES
Critical Care Note Cardiac surgery was called for the evaluation and management of: cardiogenic shock, right ventricular failure, massive PE, COVID +, ARDS, acute hypoxic / hypercarbic respiratory failure, shock liver, acute renal failure The critical nature of the patient's condition includes the following: 
 
Diagnosis for which the procedure was performed: massive PE, COVID + Procedure performed: VA ECMO, right axillary artery graft Other critical care diagnoses that are being treated and are above and beyond the standard care for the procedure being performed: 
 
Cardiogenic shock Right ventricular failure ARDS Acute hypoxic / hypercarbic respiratory failure Shock liver Acute renal failure HPI: Patient is a 40 yo gentleman with cardiogenic shock, right ventricular failure, ARDS, acute hypoxic / hypercarbic respiratory failure, shock liver, and acute renal failure. He is at imminent risk of death Cardiogenic shock Chemically coded with patient for most of the morning before starting MCS Large doses of pressors / inotropes started this am  
 
Addendum: 
Remains on high pressor / inotropic support despite MCS Escalated pressor / inotropic support over the course of the day Fluid resuscitation Blood products Protamine K centra Addendum: In severe DIC Significant bleeding from all puncture sites MAPs 10-20's Patient had bradycardia and lost pulse / pressure  at 15:36 Right ventricular failure LAMONTE showed severe RV failure and poor LV filling ARDS / Acute hypoxic / hypercarbic respiratory failure On Max Vent support - PEEP 15, FIO2 100%, RR 19 ABGs over the course of the day showed severe hypoxia / hypercarbia Acute renal failure Not making urine Suspect he will need CVVH Intake/Output Summary (Last 24 hours) at 2021 1320 Last data filed at 2021 1235 Gross per 24 hour Intake 875.87 ml Output 475 ml Net 400.87 ml Visit Vitals BP (!) 66/44 Pulse (!) 135 Comment: Simultaneous filing. User may not have seen previous data. Temp 97.7 °F (36.5 °C) Resp (!) 34 Comment: Simultaneous filing. User may not have seen previous data. Ht 5' 7\" (1.702 m) Comment: From documentation on 6/8/21 Wt 282 lb 3 oz (128 kg) SpO2 98% Comment: Simultaneous filing. User may not have seen previous data. BMI 44.20 kg/m² CXR Results  (Last 48 hours) 06/11/21 0800  XR CHEST PORT Final result Impression:  Satisfactory endotracheal tube placement. Stable bilateral lung  
opacities. Narrative:  EXAM:  CR chest portable INDICATION: Bilateral lung opacities COMPARISON: 6/8/2021. TECHNIQUE: Portable AP semiupright chest view at 0703 hours FINDINGS: The endotracheal tube terminates above the christine. The  
cardiomediastinal contours are stable. There are stable bilateral lung  
opacities. There is no pleural effusion or pneumothorax. The bones and upper  
abdomen are stable. Total critical care time - 285 minutes (CPT 65488, 99292 x 8) I personally spent the above critical care time. This is time spent at this critically ill patient's bedside / unit / floor actively involved in patient care as well as the coordination of care. This does not include any procedural time which has been billed separately. This does not include any NP/PA patient care time. I had a face to face encounter with the patient, reviewed and interpreted patient data including clinical events, labs, images, vital signs, I/O's, and examined patient. I have discussed the case and the plan and management of the patient's care with the consulting services and bedside nurses. This patient has a high probability of imminent, clinically significant deterioration, which requires the highest level of preparedness to intervene urgently.  I participated in the decision-making and personally managed or directed the management of life and organ supporting interventions to treat or prevent imminent deterioration. This patient has a critical illness or injury that is acutely impairing one or more vital organ systems such that there is a high probability of imminent or life threatening deterioration in the patient's condition. This patient requires high complexity medical decision making to assess, manipulate, and support vital organ system failure. After stabilization, this patient still requires management to prevent further life / organ threatening deterioration.

## 2021-06-11 NOTE — PROCEDURES
[facility] Procedure Note - Intubation:  
Performed by Aundrea Mixon DO . Staff Anesthesiologist/Intensivist 
 
Immediately prior to the procedure, the patient was reevaluated and found suitable for the planned procedure and any planned medications. Immediately prior to the procedure a time out was called to verify the correct patient, procedure, equipment, staff, and marking as appropriate. Medications given were etomidate and rocuronium (Zemuron). A number 7.5 cuffed ETT was placed to 23 cm at the teeth. Placement was evaluated by noting bilateral, symmetric breath sounds, good end-tidal CO2 detector color change , no breath sounds over stomach, bulb aspirator expands promptly and chest x-ray visualization. Attempts required: 1. Complications: prolonged hypoxia due to poor reserve/COVID-19. RSI was used. Vira Libman The procedure was tolerated well.

## 2021-06-11 NOTE — PROGRESS NOTES
Spiritual Care Assessment/Progress Note [address] 
 
 
NAME: Dennis Prajapati      MRN: [id number] AGE: 39 y.o. SEX: male Islam Affiliation: Unknown Language: [place] 6/11/2021     Total Time (in minutes): 40 Spiritual Assessment begun in [facility] through conversation with: 
  
    []Patient        [x] Family    [] Friend(s) Reason for Consult: Death, Inpatient Spiritual beliefs: (Please include comment if needed) 
   [] Identifies with a ingris tradition:     
   [] Supported by a ingris community:        
   [] Claims no spiritual orientation:       
   [] Seeking spiritual identity:            
   [] Adheres to an individual form of spirituality:       
   [x] Not able to assess:                   
 
    
Identified resources for coping:  
   [] Prayer                           
   [] Music                  [] Guided Imagery [x] Family/friends                 [] Pet visits [] Devotional reading                         [] Unknown 
   [] Other Interventions offered during this visit: (See comments for more details) Family/Friend(s): Affirmation of emotions/emotional suffering, End of life issues discussed Plan of Care: 
 
 [] Support spiritual and/or cultural needs  
 [] Support AMD and/or advance care planning process    
 [] Support grieving process 
 [] Coordinate Rites and/or Rituals  
 [] Coordination with community clergy [] No spiritual needs identified at this time 
 [] Detailed Plan of Care below (See Comments)  [] Make referral to Music Therapy 
[] Make referral to Pet Therapy    
[] Make referral to Addiction services 
[] Make referral to [facility] 
[] Make referral to Spiritual Care Partner 
[] No future visits requested       
[x] Follow up visits as needed Responded to notification of pt's death in ICU. Called pt's Life Partner Rachid Banda.  She is grieving the loss and wants desperately to see him. This request was conveyed to pt's nurse who will call Falguni after consulting with nurse manager and/or nursing supervisor. Chaplain Torres MDiv, MS, [facility] 
[phone] [phone])

## 2021-06-11 NOTE — PROGRESS NOTES
Bedside shift change report received from [facility], [facility]. Report included the following information SBAR, Kardex, Intake/Output, MAR and Recent Results. 2000:  Pt calm, RR 32, bipap 100%. No distress at this time, Ox4. 
0615: Pt heard yelling in room, removed bipap. RN entered room quickly, pt yelling can't breath, urinated in bed. Bipap replaced. Sats 70s. 
0625: Pt continues to sat 70s on bipap. NP called. 0630: Pt intubated. Shift Summary: Pt intubated. Diprivan, Fentanyl, Nitric started.

## 2021-06-11 NOTE — PROGRESS NOTES
Spoke with Josue Byers, life partner, about visitation. She tested positive for covid 3 days ago (6/8/21). Informed her that she could not come to hospital for visitation given her covid + status. Informed her that covid + patients are not allowed regular visitation at this time, only at end of life situations. Patient has 3 children aged 21, 6 & 8. The 6& 8year old live in [place] and have had regular recent contact with patient and Falguni. I advised that given their close contact and young ages they should not be the patient's visitor if needed. The 21year old daughter lives in [place] but could drive to [place] if needed. It should be noted that the 22 yo is on the Autism spectrum, high functioning with  and children.

## 2021-06-11 NOTE — PROGRESS NOTES
Verbal shift change report given to Ger Thao RN (oncoming nurse) by Jesús Tijerina RN (offgoing nurse). Report included the following information SBAR, Intake/Output, MAR, Med Rec Status, Cardiac Rhythm snius tachy and Alarm Parameters . 0800: Patient's O2 sats 65-68%. Other vitals stable. MD aware. 0840: Nimbex infusion started at this time at 10 mcg/kg/min. 0845: 50 mg rocuronium administered. 1109: Central line placed by Dr. Yumiko Mejia at bedside. 5142: Arterial line placed by Dr. Yumiko Mejia at bedside. 5767: Levophed infusion started at 30 mcg/min per Dr. Yumiko Mejia. 0915: Cath lab team and CT surgery at bedside to cannulate patient for ECMO. 
0955: Epinephrine infusion started at 30 mcg/min per Cath lab. 1044: Epi increased to 300 mcg/min. 1045:Levophed increased to 300 mcg/min. 1205: VA ECMO cannulation successful. 16 amps bicarb, 13 amps epinephrine, and 1 amp calcium chloride given by CRNA during cannulation due to profound hypotension. 1217: Levophed and epinephrine placed on hold due to hypertension. 1305: Levophed and epinephrine restarted previously due to hypotension - both increased to 300 mcg/min at this time. CT Surgery PA aware. 1330: Copious amounts of bleeding from axillary ECMO cannula - Dr. Jeff Molina called to bedside. 1505: 2 units PRBCs and 1 unit platelets administered. Dr. Jeff Molina remains at bedside attempting to control bleeding. Patient severely hypotensive despite 300 mcg/min epinephrine, 300 mcg/min levophed, 0.1 vasopressin, multiple albumin doses, and IV fluid boluses. 1536: Time of death determined by Dr. Jeff Molina. 1652: Spoke with Investigator Alaina Hughes at [facility] office. Provided her with information regarding patient's admission, hospital stay, and events leading up to patient's death. She states that patient will not be a ME case. 1745: Patient's life partner, Falguni, returned call to unit. Falguni is aware that she is unable to visit hospital due to her COVID positive status.  She will come to main hospital entrance this evening to  patient's belongings (cell phone, , glasses, CPAP maching, and clothing). Emotional support provided. 2010: Saskia Schroeder at front entrance of hospital - patient belongings taken downstairs to her.

## 2021-06-11 NOTE — PROCEDURES
[facility] Procedure Note - Central Venous Access:  
Performed by Jessica Tucker MD 
Diagnosis: ARDS Insertion Date: 6/11/21 Time: 0900 Obtained Consent? yes; emergent Procedure Location:  ICU. Immediately prior to the procedure, the patient was reevaluated and found suitable for the planned procedure and any planned medications. Immediately prior to the procedure a time out was called to verify the correct patient, procedure, equipment, staff, and marking as appropriate. Central line Bundle: 
Full sterile barrier precautions used. 7-Step Sterility Protocol followed. (cap, mask sterile gown, sterile gloves, large sterile sheet, hand hygiene, 2% chlorhexidine for cutaneous antisepsis) 5 mL 1% Lidocaine placed at insertion site. Patient positioned in Trendelenburg?no The site was prepped with ChloraPrep. Catheter inserted into a new site. Using Seldinger technique a Quad Lumen CVC was placed in the Left, Internal Jugular Vein via direct cannulation with 1 number of attempts for IV Access. Ultrasound Guidance was utilized. There was good dark, non-pulsatile blood return in all ports. Femoral Site? no. If Yes, reason femoral site was chosen:  
Catheter secured. Biopatch in place? yes. Sterile Bio-occlusive dressing placed. The following complications were encountered: None. A follow-up chest x-ray was ordered post procedure. The procedure was tolerated moderately. Desaturation occurred with lying flat - resolved once able to return HOB >30. Jessica Tucker MD 
Critical Care Medicine [facility]

## 2021-06-11 NOTE — DISCHARGE SUMMARY
[facility]                                                                                         Discharge Summary     Patient: Vinny Galindo       MRN: [id number]       YOB: 1975       Age: 39 y.o. Date of admission:  6/9/2021    Date of discharge:  6/11/2021    Primary care provider:  Unknown, Provider     Admitting provider:  Mahendra Ramon MD    Discharging provider(s): Manny Gaitan MD - Staff Intensivist      Consultations  · IP CONSULT TO NEPHROLOGY  · IP CONSULT TO CARDIAC SURGERY    Procedures  · V-A ECMO    Discharge destination: [facility]    Admission diagnosis  · Acute respiratory failure due to COVID-19 ([facility] [address] [U07.1, J96.00]    Please refer to the admission history and physical for details on the presenting problem. Final discharge diagnoses and brief hospital course    - Acute Hypoxic Respiratory Failure  - Acute Respiratory Distress Syndrome  - Acute Pulmonary Embolism  - Right Ventricular Failure  - Shock Liver  - Lactic Acidosis  - Obstructive Shock  - Cardiogenic Shock  - Acute Blood Loss  - Disseminate Intravascular Coagulation  - Hypokalemia  - YVROSE  - Diarrhea  - Lactic Acidosis  - Hypertension  - Anxiety  - Sleep Apnea  - Hyperglycemia  - Obesity    45 y.o. Male who presented to [facility] with complaint of increasing shortness of breath. He had Covid 19 last year. Majo Rivera went on vacation to [facility] [address] his wife and developed diarrhea. He had fever and body aches. On Wednesday he tested positive for Covid and was getting better. Today his wife found him confused and he had an oxygen saturation of 72%. He was started on Bi-pap and given steroids and started on azithromycin. Initially did well on bipap and supportive therapies but has had decreased tolerance for mask coming off. Overnight from 6/10-6/11, he suffered repeated episodes of agitation. Patient removed bipap mask several times with subsequent desaturations.  He was intubated around 0645. ABG on 100%FiO2, PEEP 14, RR20:  7.1/69/44/21. Helder started with no appreciable improvement in SpO2. Poor candidate for proning given tenuous oxygenation status and habitus. ECMO consult placed. Life partner updated. Arterial line placed which revealed hypotension. Emergently started on levophed. Patient initially plan for V-V ECMO. On LAMONTE placement and interrogation, RV noted to have severe dyskinesia and was markedly dilated. High suspicion for acute PE. Decision made to switch to V-A ECMO given instability and ongoing shock state. Difficulties with cannulation resulted in Right axillary artery cutdown for arterial cannulae. Had ongoing issues with severe hypotension and hypoxia. ECLS performed for several hours via ECMO circuit. He ultimately developed hemorrhage refractory to protamine and reexploration. Suspect DIC given severity of metabolic derangements. Despite supramaximal support, his shock progressed. He  at 1536.      Physical examination at discharge  Visit Vitals  BP (!) 150/99   Pulse (!) 120   Temp (!) 96 °F (35.6 °C)   Resp (!) 39   Ht 5' 7\" (1.702 m)   Wt 128 kg (282 lb 3 oz)   SpO2 99%   BMI 44.20 kg/m²        Recent Labs     21  1249 21  0615 06/10/21  0308   WBC 32.1* 8.3 5.1   HGB 10.7* 14.8 14.2   HCT 34.0* 44.6 42.5   * 142* 196     Recent Labs     21  1249 21  0615 06/10/21  0308 21  0603 21  2140   * 141 138 135* 136   K 2.9* 4.0 3.6 3.5 2.9*    106 103 99 96*   CO2 19* 27 27 25 24   BUN 34* 31* 22* 30* 26*   CREA 2.03* 0.89 0.80 1.33* 1.85*   * 213* 237* 236* 196*   CA 8.2* 8.7 8.8 8.4* 8.3*   MG  --   --   --  2.1 1.6   PHOS  --   --   --  3.4  --      Recent Labs     21  1249 21  1408 21  0603   * 72 82   TP 3.4* 5.9* 6.8   ALB 1.2* 2.0* 2.4*   GLOB 2.2 3.9 4.4*   LPSE  --   --  89     Recent Labs     21  2140   INR 1.1   PTP 11.1      Recent Labs     21  0603   FERR 5,336* Recent Labs     06/11/21  1450 06/11/21  1318   PH 6.97* 7.10*   PCO2 58* 59*   PO2 72* 356*     Pertinent imaging studies:    CXR 6/11/21:  Satisfactory endotracheal tube placement. Stable bilateral lung opacities. CXR 6/8/21: Extensive bilateral pulmonary infiltrates consistent with atypical or viral pneumonia including Covid 19.   ---------------------------------    Chronic Diagnoses:    Problem List as of 6/11/2021 Never Reviewed        Codes Class Noted - Resolved    Acute respiratory failure due to COVID-19 [facility]) ICD-10-CM: U07.1, J96.00  ICD-9-CM: 518.81, 079.89  6/9/2021 - Present              Time spent on discharge related activities today greater than 30 minutes.       Signed:      Todd Sykes MD   Staff Intensivist  [facility]    6/11/2021   7:16 PM      Cc: Unknown, Provider

## 2021-06-11 NOTE — BRIEF OP NOTE
BRIEF OP NOTE Pre-Op Diagnosis: ARDS Post-Op Diagnosis: ARDS Procedure: Procedure(s): VA ECMO INSERTION WITH RIGHT AXILLARY CUTDOWN. LAMONTE BY DR. Son Tafoya. Surgeon: Jason Naqvi MD 
 
Assistant(s): Kemi Berg PA-C Anesthesia: General  
 
Infusions:  precedex, epi, vaso, levo Estimated Blood Loss: 
1000ml Complications: Femoral vascular access issues Findings: hypercarbic and hypoxic respiratory failure

## 2021-06-12 LAB
ABO + RH BLD: NORMAL
ARTERIAL PATENCY WRIST A: YES
BASE EXCESS BLDA CALC-SCNC: 1.4 MMOL/L
BDY SITE: ABNORMAL
BLD PROD TYP BPU: NORMAL
BLOOD GROUP ANTIBODIES SERPL: NORMAL
BPU ID: NORMAL
CROSSMATCH RESULT,%XM: NORMAL
CROSSMATCH RESULT,%XM: NORMAL
HCO3 BLDA-SCNC: 26 MMOL/L (ref 22–26)
PCO2 BLDA: 41 MMHG (ref 35–45)
PH BLDA: 7.42 [PH] (ref 7.35–7.45)
PO2 BLDA: 68 MMHG (ref 80–100)
SAO2 % BLD: 94 % (ref 92–97)
SAO2% DEVICE SAO2% SENSOR NAME: ABNORMAL
SPECIMEN EXP DATE BLD: NORMAL
SPECIMEN SITE: ABNORMAL
STATUS OF UNIT,%ST: NORMAL
UNIT DIVISION, %UDIV: 0

## 2021-06-12 PROCEDURE — 65610000006 HC RM INTENSIVE CARE

## 2021-06-12 NOTE — OP NOTES
[address] [facility] 
OPERATIVE REPORT Name:  Bridget Baltazar 
MR#:  [id number] :  1975 ACCOUNT #:  [de-identified] DATE OF SERVICE:  2021 PREOPERATIVE DIAGNOSIS:  Previous construction of right axillary chimney graft used for extracorporeal membrane oxygenation. POSTOPERATIVE DIAGNOSIS:  Previous construction of right axillary chimney graft used for extracorporeal membrane oxygenation. PROCEDURE PERFORMED:  Exploration of chest for postoperative hemorrhage (CPT code 34229). SURGEON:  Nam Ramos MD 
 
ASSISTANT:  SILVIA Hudson 
 
ANESTHESIA:  General. 
 
COMPLICATIONS:  None. SPECIMENS REMOVED:  None. IMPLANTS:  None. ESTIMATED BLOOD LOSS:  500 mL. PROCEDURE:  The patient is a very pleasant 65-year-old gentleman who recently went on VA-ECMO. He then developed severe DIC, essentially was bleeding out of every puncture site that he had. We started giving him blood products as well as Kcentra as well as protamine; however, this did not resolve the issue. We then reexplored the right axillary anastomosis by removing the sutures we had previously placed and there was just diffuse bleeding from all the suture holes consistent with coagulopathy. We placed pledgeted sutures in  fashion completely around the anastomotic site; however, the bleeding continued through the pledgets. We eventually packed the area off with QuikClot as well as Fibrillar. Vicryl sutures were used to close the incision as well as staples. I was present for the entire procedure.; ; PA-BLAYNE assistance was needed due to difficulty of procedure, dissection, and identification of pertinent anatomy throughout the case Adrienne Cunningham MD 
 
 
[id number] 
D:  2021 18:03 
T:  2021 2:27 JOB #:  [id number]

## 2021-06-12 NOTE — OP NOTES
[address]  
OPERATIVE REPORT Name:  Elle Chaves 
MR#:  [id number] :  1975 ACCOUNT #:  [de-identified] DATE OF SERVICE:  2021 PREOPERATIVE DIAGNOSES: 
1.  COVID/acute respiratory distress syndrome. 2.  Massive pulmonary embolism. POSTOPERATIVE DIAGNOSES: 
1.  COVID/acute respiratory distress syndrome. 2.  Massive pulmonary embolism. PROCEDURES PERFORMED: 
1. Open insertion of peripheral arterial-venous cannula for the purposes of veno-arterial extracorporeal membrane oxygenation (CPT code 45483). 2.  Initiation of veno-arterial extracorporeal membrane oxygenation (CPT code 00505). 3.  Insertion of a 10-mm Hemashield graft to the right axillary artery used for purposes of extracorporeal membrane oxygenation. SURGEON:  Francisca Vail MD 
 
ASSISTANT:  SILVIA Chowdary 
 
ANESTHESIA:  General endotracheal anesthesia. COMPLICATIONS:  None. SPECIMENS REMOVED:  None. IMPLANTS:  None. ESTIMATED BLOOD LOSS:  500 mL. PROCEDURE:  The patient is a very pleasant 27-year-old gentleman suffering from severe COVID/ARDS as well as a massive pulmonary embolism. Shortly after intubation or just prior to intubation, the patient dropped his pressure significantly and had to go up on major pressors and inotropes. Due to dire status, we were asked to put him on  [facility]. Due to vasospasm of his lower extremity arterial system due to high-dose pressors and inotropes, we had to access his right axillary artery by incision, essentially we did a cutdown at his right infraclavicular site, dissected down to the level of the right axillary artery, placed a site biter in the artery, opened with a 75 blade and further extended with forward and reverse Morfin. We then performed a 10 mm Hemashield graft to right axillary anastomosis using a 5-0 Prolene suture.   We then glued it, released the clamps, and then we had already percutaneously accessed his right common femoral vein and dilated up to a 22-Bulgarian venous cannula. This was hooked up to the ECMO circuit and we went on. I was present for the entire procedure.; ; PA-C assistance was needed due to difficulty of procedure, dissection, and identification of pertinent anatomy throughout the case Mo Lujan MD 
 
 
[id number] 
D:  06/11/2021 17:59 
T:  06/12/2021 1:49 JOB #:  [id number]

## 2021-06-13 PROCEDURE — 65610000006 HC RM INTENSIVE CARE

## 2021-06-14 LAB
BACTERIA SPEC CULT: NORMAL
SERVICE CMNT-IMP: NORMAL

## 2021-06-14 PROCEDURE — 65610000006 HC RM INTENSIVE CARE

## 2021-06-15 NOTE — PROGRESS NOTES
Chart accessed to correct clerical error in discharging patient. Per Dr. Karrie Hendrickson note date 2021 at 1939, [address]  on 2021 at [phone]. Chart was retained in system erroneously.

## 2025-08-06 NOTE — CONSULTS
[address] [facility], [address] [facility] Phone: [phone]   [phone] NEPHROLOGY CONSULT NOTE Patient: Chet Piper MRN: [id number]  PCP: Unknown, Provider :     1975  Age:   39 y.o. Sex:  male Referring physician: Bryanna Mathews MD 
Reason for consultation: 39 y.o. male with Acute respiratory failure due to COVID-19 ([facility] [address] [Z04.7, O11.53] complicated by ARF Admission Date: 2021  4:58 AM  LOS: 0 days ASSESSMENT and PLAN :  
YVROSE : 
- pre renal azotemia(from (HCTZ) based on clinical course so far - COVID could potentially playing a role  
- baseline Creatinine unknown  
- renal US ordered - check UA and urine lytes  
- continue w/ LR  
- check echocardiogram 
- hold lisinopril, HCTZ  
- strict I.O B/L COVID PNA  
- significant Hypoxia - On BIPAP  
- mx per [facility]  
 
HTN  
- hold Lisinopril/ HCTZ for now - BP low but stable Morbid Obesity CARRIE Care Plan discussed with:  RN Thank you for consulting [facility] in the care of your patient. Subjective: HPI: Chet Piper is a 39 y.o.  male who has been admitted to the hospital for worsening SOB and Hypoxia. He was transferred from [facility] where he presented with +ve COVID test for 3 days and worsening SOB. on arrival he was in ARF and we were asked to see him for the same. He was given IVF bolus and Creatinine improved from 1.8 to 1.3 mg/dl overnight. he is currently in isolation and hence hx is obtained from the chart. No prior baseline renal function. Appears to have HTN and is on Lisinopril/ HCTZ Past Medical Hx:  
Past Medical History:  
Diagnosis Date  Anxiety  Hypertension  Sleep apnea Past Surgical Hx: 
 No past surgical history on file. No Known Allergies Social Hx:  reports that he has quit smoking. He quit after 15.00 years of use.  He has never used smokeless tobacco. He reports previous Will tolerate treatment with no reactions   alcohol use. He reports that he does not use drugs. Family History Problem Relation Age of Onset  Asthma Mother  Cancer Mother  Diabetes Mother  Heart Disease Father  Hypertension Father Review of Systems: 
Unable to perform ROS Objective:   
Vitals:   
Vitals:  
 06/09/21 0700 06/09/21 0730 06/09/21 0745 06/09/21 0800 BP: (!) 103/57 117/69  130/66 Pulse: 73 77  81 Resp: 20 (!) 32  27 Temp:      
SpO2: 92% 94% 95% 91% I&O's:  No intake/output data recorded. Visit Vitals /66 Pulse 81 Temp (P) 98 °F (36.7 °C) Resp 27 SpO2 91% Physical Exam: examined through glass doors General:  Obese, SOB at rest, BIPAP HEENT: NAD Lungs : symmetrical chest movements, increaed WOB 
CVS: RRR on monitor Abdomen: obese, distended Extremities: no Edema Skin: No rash or lesions. Neurologic: unable to assess Psych:  Unable to assess Laboratory Results: 
 
Recent Labs  
  06/09/21 
0603 06/08/21 
2140 * 136  
K 3.5 2.9*  
CL 99 96* CO2 25 24 * 196* BUN 30* 26* CREA 1.33* 1.85* CA 8.4* 8.3*  
MG 2.1 1.6 PHOS 3.4  --   
ALB 2.4* 2.4* * 124* INR  --  1.1 Recent Labs  
  06/09/21 
0603 06/08/21 
2140 WBC 7.3 9.0 HGB 14.9 15.7 HCT 43.2 44.9  185 No results found for: SDES No results found for: CULT Recent Results (from the past 24 hour(s)) CBC WITH AUTOMATED DIFF Collection Time: 06/08/21  9:40 PM  
Result Value Ref Range WBC 9.0 4.4 - 11.3 K/uL  
 RBC 5.07 4.50 - 5.90 M/uL  
 HGB 15.7 13.5 - 17.5 g/dL HCT 44.9 41 - 53 % MCV 88.5 80 - 100 FL  
 MCH 30.9 (L) 31 - 34 PG  
 MCHC 34.9 31.0 - 36.0 g/dL  
 RDW 13.5 11.5 - 14.5 % PLATELET 195 186 - 315 K/uL MPV 8.1 6.5 - 11.5 FL  
 NRBC 0.1  WBC ABSOLUTE NRBC 0.00 K/uL NEUTROPHILS 93 (H) 42 - 75 % LYMPHOCYTES 5 (L) 20.5 - 51.1 % MONOCYTES 2 1.7 - 9.3 % EOSINOPHILS 0 (L) 0.9 - 2.9 % BASOPHILS 0 0.0 - 2.5 % ABS. NEUTROPHILS 8.3 (H) 1.8 - 7.7 K/UL  
 ABS. LYMPHOCYTES 0.4 (L) 1.0 - 4.8 K/UL  
 ABS. MONOCYTES 0.2 0.2 - 2.4 K/UL  
 ABS. EOSINOPHILS 0.0 0.0 - 0.7 K/UL  
 ABS. BASOPHILS 0.0 0.0 - 0.2 K/UL PROTHROMBIN TIME + INR Collection Time: 06/08/21  9:40 PM  
Result Value Ref Range Prothrombin time 11.1 9.0 - 11.1 sec INR 1.1 0.9 - 1.1 METABOLIC PANEL, COMPREHENSIVE Collection Time: 06/08/21  9:40 PM  
Result Value Ref Range Sodium 136 136 - 145 mmol/L Potassium 2.9 (L) 3.5 - 5.1 mmol/L Chloride 96 (L) 97 - 108 mmol/L  
 CO2 24 21 - 32 mmol/L Anion gap 16 (H) 5 - 15 mmol/L Glucose 196 (H) 65 - 100 mg/dL BUN 26 (H) 6 - 20 mg/dL Creatinine 1.85 (H) 0.70 - 1.30 mg/dL BUN/Creatinine ratio 14 12 - 20 GFR est AA 49 (L) >60 ml/min/1.73m2 GFR est non-AA 40 (L) >60 ml/min/1.73m2 Calcium 8.3 (L) 8.5 - 10.1 mg/dL Bilirubin, total 0.5 0.2 - 1.0 mg/dL AST (SGOT) 135 (H) 15 - 37 U/L  
 ALT (SGPT) 124 (H) 12 - 78 U/L Alk. phosphatase 80 45 - 117 U/L Protein, total 6.9 6.4 - 8.2 g/dL Albumin 2.4 (L) 3.5 - 5.0 g/dL Globulin 4.5 (H) 2.0 - 4.0 g/dL A-G Ratio 0.5 (L) 1.1 - 2.2    
TROPONIN I Collection Time: 06/08/21  9:40 PM  
Result Value Ref Range Troponin-I, Qt. 0.11 (H) <0.05 ng/mL MAGNESIUM Collection Time: 06/08/21  9:40 PM  
Result Value Ref Range Magnesium 1.6 1.6 - 2.4 mg/dL LACTIC ACID Collection Time: 06/08/21  9:40 PM  
Result Value Ref Range Lactic acid 2.3 (HH) 0.4 - 2.0 mmol/L  
EKG, 12 LEAD, INITIAL Collection Time: 06/08/21  9:53 PM  
Result Value Ref Range Ventricular Rate 104 BPM  
 Atrial Rate 104 BPM  
 P-R Interval 134 ms QRS Duration 105 ms Q-T Interval 351 ms QTC Calculation (Bezet) 462 ms Calculated P Axis 36 degrees Calculated R Axis 68 degrees Calculated T Axis 81 degrees Diagnosis Sinus tachycardia Probable left atrial enlargement RSR' in V1 or V2, right VCD or RVH 
ST elevation suggests acute pericarditis METABOLIC PANEL, COMPREHENSIVE Collection Time: 06/09/21  6:03 AM  
Result Value Ref Range Sodium 135 (L) 136 - 145 mmol/L Potassium 3.5 3.5 - 5.1 mmol/L Chloride 99 97 - 108 mmol/L  
 CO2 25 21 - 32 mmol/L Anion gap 11 5 - 15 mmol/L Glucose 236 (H) 65 - 100 mg/dL BUN 30 (H) 6 - 20 MG/DL Creatinine 1.33 (H) 0.70 - 1.30 MG/DL  
 BUN/Creatinine ratio 23 (H) 12 - 20 GFR est AA >60 >60 ml/min/1.73m2 GFR est non-AA 59 (L) >60 ml/min/1.73m2 Calcium 8.4 (L) 8.5 - 10.1 MG/DL Bilirubin, total 0.5 0.2 - 1.0 MG/DL  
 ALT (SGPT) 107 (H) 12 - 78 U/L  
 AST (SGOT) 127 (H) 15 - 37 U/L Alk. phosphatase 82 45 - 117 U/L Protein, total 6.8 6.4 - 8.2 g/dL Albumin 2.4 (L) 3.5 - 5.0 g/dL Globulin 4.4 (H) 2.0 - 4.0 g/dL A-G Ratio 0.5 (L) 1.1 - 2.2 MAGNESIUM Collection Time: 06/09/21  6:03 AM  
Result Value Ref Range Magnesium 2.1 1.6 - 2.4 mg/dL PHOSPHORUS Collection Time: 06/09/21  6:03 AM  
Result Value Ref Range Phosphorus 3.4 2.6 - 4.7 MG/DL  
CBC WITH AUTOMATED DIFF Collection Time: 06/09/21  6:03 AM  
Result Value Ref Range WBC 7.3 4.1 - 11.1 K/uL  
 RBC 4.94 4.10 - 5.70 M/uL  
 HGB 14.9 12.1 - 17.0 g/dL HCT 43.2 36.6 - 50.3 % MCV 87.4 80.0 - 99.0 FL  
 MCH 30.2 26.0 - 34.0 PG  
 MCHC 34.5 30.0 - 36.5 g/dL  
 RDW 13.2 11.5 - 14.5 % PLATELET 495 027 - 895 K/uL MPV 10.0 8.9 - 12.9 FL  
 NRBC 0.0 0  WBC ABSOLUTE NRBC 0.00 0.00 - 0.01 K/uL NEUTROPHILS 92 (H) 32 - 75 % BAND NEUTROPHILS 2 0 - 6 % LYMPHOCYTES 5 (L) 12 - 49 % MONOCYTES 0 (L) 5 - 13 % EOSINOPHILS 0 0 - 7 % BASOPHILS 0 0 - 1 % METAMYELOCYTES 1 (H) 0 % IMMATURE GRANULOCYTES 0 %  
 ABS. NEUTROPHILS 6.9 1.8 - 8.0 K/UL  
 ABS. LYMPHOCYTES 0.4 (L) 0.8 - 3.5 K/UL  
 ABS. MONOCYTES 0.0 0.0 - 1.0 K/UL  
 ABS. EOSINOPHILS 0.0 0.0 - 0.4 K/UL  
 ABS. BASOPHILS 0.0 0.0 - 0.1 K/UL  
 ABS. IMM.  GRANS. 0.0 K/UL  
 DF MANUAL    
 RBC COMMENTS NORMOCYTIC, NORMOCHROMIC    
LIPASE Collection Time: 06/09/21  6:03 AM  
Result Value Ref Range Lipase 89 73 - 393 U/L FERRITIN Collection Time: 06/09/21  6:03 AM  
Result Value Ref Range Ferritin 5,336 (H) 26 - 388 NG/ML  
CRP, HIGH SENSITIVITY Collection Time: 06/09/21  6:03 AM  
Result Value Ref Range CRP, High sensitivity >9.5 mg/L  
D DIMER Collection Time: 06/09/21  6:03 AM  
Result Value Ref Range D-dimer 6.90 (H) 0.00 - 0.65 mg/L FEU FIBRINOGEN Collection Time: 06/09/21  6:03 AM  
Result Value Ref Range Fibrinogen 719 (H) 200 - 475 mg/dL LACTIC ACID Collection Time: 06/09/21  6:06 AM  
Result Value Ref Range Lactic acid 1.4 0.4 - 2.0 MMOL/L  
EKG, 12 LEAD, INITIAL Collection Time: 06/09/21  8:04 AM  
Result Value Ref Range Ventricular Rate 76 BPM  
 Atrial Rate 76 BPM  
 P-R Interval 142 ms QRS Duration 102 ms Q-T Interval 414 ms QTC Calculation (Bezet) 465 ms Calculated P Axis 29 degrees Calculated R Axis 34 degrees Calculated T Axis 88 degrees Diagnosis Normal sinus rhythm Incomplete right bundle branch block Nonspecific T wave abnormality Prolonged QT No previous ECGs available Urine dipstick: No results found for: COLOR, APPRN, SPGRU, REFSG, JOSE, PROTU, GLUCU, KETU, BILU, UROU, MAXWELL, LEUKU, GLUKE, EPSU, BACTU, WBCU, RBCU, CASTS, UCRY I have reviewed the following: All pertinent labs, microbiology data, radiology imaging for my assessment Medications list Personally Reviewed   [x]      Yes     []               No    
 
Medications: 
Prior to Admission medications Medication Sig Start Date End Date Taking? Authorizing Provider  
lisinopriL (PRINIVIL, ZESTRIL) 10 mg tablet Take 10 mg by mouth daily. Florinda Artis MD  
hydroCHLOROthiazide (MICROZIDE) 12.5 mg capsule Take 12.5 mg by mouth daily. Florinda Artis MD  
escitalopram oxalate (Lexapro) 5 mg tablet Take 5 mg by mouth daily.     Florinda Artis MD ergocalciferol (ERGOCALCIFEROL) 1,250 mcg (50,000 unit) capsule Take 50,000 Units by mouth every seven (7) days. Chandra, MD Florinda  
ondansetron hcl (Zofran) 4 mg tablet Take 4 mg by mouth every eight (8) hours as needed for Nausea or Vomiting. Other, MD lForinda  
  
 
Thank you for allowing us to participate in the care of this patient. We will follow patient. Please dont hesitate to call with any questions Deandra Spence [facility] [id number] [place] [address], [address] [facility] Phone - [phone] Fax - [phone] 
[email or web address]

## (undated) DEVICE — REM POLYHESIVE ADULT PATIENT RETURN ELECTRODE: Brand: VALLEYLAB

## (undated) DEVICE — GOWN,SIRUS,NONRNF,SETINSLV,XL,20/CS: Brand: MEDLINE

## (undated) DEVICE — SURGICAL PROCEDURE PACK BASIN MAJ SET CUST NO CAUT

## (undated) DEVICE — CLIP LIG M BLU TI HRT SHP WIRE HORZ 600 PER BX

## (undated) DEVICE — SYSTEM TISS GLUE 5ML CONTAIN SYR PLUNG STD SYR TIP 12MM 5PKS/EA

## (undated) DEVICE — ELECTROSURGICAL DEVICE HOLSTER;FOR USE WITH MAXIMUM PEAK VOLTAGE OF 4000 V: Brand: FORCE TRIVERSE

## (undated) DEVICE — SOL IRR SOD CL 0.9% 1000ML BTL --

## (undated) DEVICE — PREP SKN CHLRAPRP APL 26ML STR --

## (undated) DEVICE — SUTURE PROL SZ 5-0 L30IN NONABSORBABLE BLU L13MM RB-2 1/2 8710H

## (undated) DEVICE — LOOP,VESSEL,MAXI,BLUE,2/PK,STERILE: Brand: MEDLINE

## (undated) DEVICE — AMPLATZ EXTRA STIFF WIRE GUIDE: Brand: AMPLATZ

## (undated) DEVICE — DRESSING SIL W4XL5IN ANTIBACT GELLING FBR CYTOFORM

## (undated) DEVICE — STERILE POLYISOPRENE POWDER-FREE SURGICAL GLOVES WITH EMOLLIENT COATING: Brand: PROTEXIS

## (undated) DEVICE — 1000ML,PRESSURE INFUSER W/STOPCOCK: Brand: MEDLINE

## (undated) DEVICE — FOGARTY - HYDRAGRIP SURGICAL - CLAMP INSERTS: Brand: FOGARTY SOFTJAW

## (undated) DEVICE — TIDISHIELD TRANSPORT, CONTAINMENT COVER FOR BACK TABLE 4'6" (1.37M) TO 8' (2.43M) IN LENGTH: Brand: TIDISHIELD

## (undated) DEVICE — KIT CANN 19FR TIP L18CM VENT CONN 3/8IN ART POLYUR PERC

## (undated) DEVICE — DRAPE PRB US TRNSDCR 6X96IN --

## (undated) DEVICE — GLOVE SURG SZ 7.5 L11.2IN THK9.8MIL STRW LTX POLYMER BEAD

## (undated) DEVICE — Device: Brand: VASCULAR DILATOR KIT

## (undated) DEVICE — TUBING, SUCTION, 1/4" X 12', STRAIGHT: Brand: MEDLINE

## (undated) DEVICE — MEDI-TRACE CADENCE ADULT, DEFIBRILLATION ELECTRODE -RTS  (10 PR/PK) - PHILIPS: Brand: MEDI-TRACE CADENCE

## (undated) DEVICE — MICROPUNCTURE INTRODUCER SET SILHOUETTE TRANSITIONLESS WITH STAINLESS STEEL WIRE GUIDE: Brand: MICROPUNCTURE

## (undated) DEVICE — TUBING SUCT 8FR MAL ALUM SHFT FN CAP VENT UNIV CONN W/ OBT

## (undated) DEVICE — SUTURE VCRL SZ 0 L18IN ABSRB VLT L40MM CT 1/2 CIR J752D

## (undated) DEVICE — CV INCISE SHEET: Brand: CONVERTORS

## (undated) DEVICE — SYR 3ML LL TIP 1/10ML GRAD --

## (undated) DEVICE — 3M™ TEGADERM™ TRANSPARENT FILM DRESSING FRAME STYLE, 1626W, 4 IN X 4-3/4 IN (10 CM X 12 CM), 50/CT 4CT/CASE: Brand: 3M™ TEGADERM™

## (undated) DEVICE — PINNACLE INTRODUCER SHEATH: Brand: PINNACLE

## (undated) DEVICE — INTENDED FOR TISSUE SEPARATION, AND OTHER PROCEDURES THAT REQUIRE A SHARP SURGICAL BLADE TO PUNCTURE OR CUT.: Brand: BARD-PARKER ® CARBON RIB-BACK BLADES

## (undated) DEVICE — DRESSING HEMSTAT W12XL12IN 3 PLY QUIKCLOT CONTROL+

## (undated) DEVICE — DRAPE,LAPAROTOMY,T,PEDI,STERILE: Brand: MEDLINE

## (undated) DEVICE — AGENT HEMSTAT W4XL4IN OXIDIZED REGENERATED CELOS ABSRB SFT

## (undated) DEVICE — Device

## (undated) DEVICE — DRAPE SURG W41XL74IN CLR FULL SZ C ARM 3 ADH POLY STRP E

## (undated) DEVICE — SET TRNQT L55IN RED BLU CLR CLR CODE TB DLP

## (undated) DEVICE — SPONGE GZ W4XL4IN COT 12 PLY TYP VII WVN C FLD DSGN

## (undated) DEVICE — TOWEL,OR,DSP,ST,BLUE,STD,2/PK,40PK/CS: Brand: MEDLINE

## (undated) DEVICE — SPONGE LAP 18X18IN STRL -- 5/PK

## (undated) DEVICE — SUTURE PERMA-HAND 0 L18IN NONABSORBABLE BLK CT-1 L36MM 1/2 C021D

## (undated) DEVICE — Z DISCONTINUED NO SUB IDED CANNULA PERF 22FR FEM VEN THN WALL WIRE WND L INTERSPACED

## (undated) DEVICE — YANKAUER,BULB TIP,W/O VENT,RIGID,STERILE: Brand: MEDLINE

## (undated) DEVICE — TRAXI PANNICULUS RETRACTOR WITH RETENTUS TECHNOLOGY (BMI 30-50): Brand: TRAXI® PANNICULUS RETRACTOR

## (undated) DEVICE — SNAP KOVER: Brand: UNBRANDED